# Patient Record
Sex: FEMALE | Race: WHITE
[De-identification: names, ages, dates, MRNs, and addresses within clinical notes are randomized per-mention and may not be internally consistent; named-entity substitution may affect disease eponyms.]

---

## 2018-03-09 ENCOUNTER — HOSPITAL ENCOUNTER (INPATIENT)
Dept: HOSPITAL 76 - ED | Age: 83
LOS: 4 days | Discharge: NURSING, REHAB OR LONG-TERM CARE | DRG: 689 | End: 2018-03-13
Attending: NURSE PRACTITIONER | Admitting: INTERNAL MEDICINE
Payer: MEDICARE

## 2018-03-09 ENCOUNTER — HOSPITAL ENCOUNTER (OUTPATIENT)
Dept: HOSPITAL 76 - EMS | Age: 83
Discharge: ANOTHER HOSPITAL | End: 2018-03-09
Attending: SURGERY
Payer: MEDICARE
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
## 2018-03-09 DIAGNOSIS — G93.41: ICD-10-CM

## 2018-03-09 DIAGNOSIS — E87.1: ICD-10-CM

## 2018-03-09 DIAGNOSIS — I48.0: ICD-10-CM

## 2018-03-09 DIAGNOSIS — E78.4: ICD-10-CM

## 2018-03-09 DIAGNOSIS — E86.1: ICD-10-CM
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
## 2018-03-09 DIAGNOSIS — M25.562: ICD-10-CM

## 2018-03-09 DIAGNOSIS — I50.9: ICD-10-CM

## 2018-03-09 DIAGNOSIS — Z16.11: ICD-10-CM

## 2018-03-09 DIAGNOSIS — S93.402A: ICD-10-CM

## 2018-03-09 DIAGNOSIS — R53.1: ICD-10-CM

## 2018-03-09 DIAGNOSIS — N17.9: ICD-10-CM

## 2018-03-09 DIAGNOSIS — E03.9: ICD-10-CM

## 2018-03-09 DIAGNOSIS — B96.89: ICD-10-CM

## 2018-03-09 DIAGNOSIS — N30.00: Primary | ICD-10-CM

## 2018-03-09 DIAGNOSIS — I48.91: ICD-10-CM

## 2018-03-09 DIAGNOSIS — I11.0: ICD-10-CM

## 2018-03-09 DIAGNOSIS — I69.359: ICD-10-CM

## 2018-03-09 DIAGNOSIS — J44.9: ICD-10-CM

## 2018-03-09 DIAGNOSIS — E86.0: ICD-10-CM

## 2018-03-09 DIAGNOSIS — Z79.01: ICD-10-CM

## 2018-03-09 DIAGNOSIS — R07.9: Primary | ICD-10-CM
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
## 2018-03-09 LAB
ALBUMIN DIAFP-MCNC: 3.9 G/DL (ref 3.2–5.5)
ALBUMIN/GLOB SERPL: 1.1 {RATIO} (ref 1–2.2)
ALP SERPL-CCNC: 49 IU/L (ref 42–121)
ALT SERPL W P-5'-P-CCNC: 12 IU/L (ref 10–60)
ANION GAP SERPL CALCULATED.4IONS-SCNC: 12 MMOL/L (ref 6–13)
AST SERPL W P-5'-P-CCNC: 25 IU/L (ref 10–42)
BASOPHILS NFR BLD AUTO: 0.1 10^3/UL (ref 0–0.1)
BASOPHILS NFR BLD AUTO: 0.4 %
BILIRUB BLD-MCNC: 0.6 MG/DL (ref 0.2–1)
BUN SERPL-MCNC: 26 MG/DL (ref 6–20)
CALCIUM UR-MCNC: 8.4 MG/DL (ref 8.5–10.3)
CHLORIDE SERPL-SCNC: 95 MMOL/L (ref 101–111)
CLARITY UR REFRACT.AUTO: (no result)
CO2 SERPL-SCNC: 23 MMOL/L (ref 21–32)
CREAT SERPLBLD-SCNC: 1.9 MG/DL (ref 0.4–1)
EOSINOPHIL # BLD AUTO: 0.1 10^3/UL (ref 0–0.7)
EOSINOPHIL NFR BLD AUTO: 0.6 %
ERYTHROCYTE [DISTWIDTH] IN BLOOD BY AUTOMATED COUNT: 14.1 % (ref 12–15)
GFRSERPLBLD MDRD-ARVRAT: 25 ML/MIN/{1.73_M2} (ref 89–?)
GLOBULIN SER-MCNC: 3.7 G/DL (ref 2.1–4.2)
GLUCOSE SERPL-MCNC: 147 MG/DL (ref 70–100)
GLUCOSE UR QL STRIP.AUTO: NEGATIVE MG/DL
HGB UR QL STRIP: 10.7 G/DL (ref 12–16)
KETONES UR QL STRIP.AUTO: NEGATIVE MG/DL
LIPASE SERPL-CCNC: 23 U/L (ref 22–51)
LYMPHOCYTES # SPEC AUTO: 0.7 10^3/UL (ref 1.5–3.5)
LYMPHOCYTES NFR BLD AUTO: 5.8 %
MAGNESIUM SERPL-MCNC: 1.8 MG/DL (ref 1.7–2.8)
MCH RBC QN AUTO: 31.9 PG (ref 27–31)
MCHC RBC AUTO-ENTMCNC: 33.1 G/DL (ref 32–36)
MCV RBC AUTO: 96.4 FL (ref 81–99)
MONOCYTES # BLD AUTO: 0.6 10^3/UL (ref 0–1)
MONOCYTES NFR BLD AUTO: 5.2 %
NEUTROPHILS # BLD AUTO: 10.4 10^3/UL (ref 1.5–6.6)
NEUTROPHILS # SNV AUTO: 11.8 X10^3/UL (ref 4.8–10.8)
NEUTROPHILS NFR BLD AUTO: 88 %
NITRITE UR QL STRIP.AUTO: NEGATIVE
PDW BLD AUTO: 7.1 FL (ref 7.9–10.8)
PH UR STRIP.AUTO: 6 PH (ref 5–7.5)
PHOSPHATE BLD-MCNC: 3.8 MG/DL (ref 2.5–4.6)
PLATELET # BLD: 245 10^3/UL (ref 130–450)
PROT SPEC-MCNC: 7.6 G/DL (ref 6.7–8.2)
PROT UR STRIP.AUTO-MCNC: NEGATIVE MG/DL
RBC # UR STRIP.AUTO: (no result) /UL
RBC # URNS HPF: (no result) /HPF (ref 0–5)
RBC MAR: 3.35 10^6/UL (ref 4.2–5.4)
SODIUM SERPLBLD-SCNC: 130 MMOL/L (ref 135–145)
SP GR UR STRIP.AUTO: <=1.005 (ref 1–1.03)
SQUAMOUS URNS QL MICRO: (no result)
UROBILINOGEN UR QL STRIP.AUTO: (no result) E.U./DL
UROBILINOGEN UR STRIP.AUTO-MCNC: NEGATIVE MG/DL
WBC CLUMPS URNS QL MICRO: PRESENT

## 2018-03-09 PROCEDURE — 96365 THER/PROPH/DIAG IV INF INIT: CPT

## 2018-03-09 PROCEDURE — 36415 COLL VENOUS BLD VENIPUNCTURE: CPT

## 2018-03-09 PROCEDURE — 70450 CT HEAD/BRAIN W/O DYE: CPT

## 2018-03-09 PROCEDURE — 83735 ASSAY OF MAGNESIUM: CPT

## 2018-03-09 PROCEDURE — 87086 URINE CULTURE/COLONY COUNT: CPT

## 2018-03-09 PROCEDURE — 80053 COMPREHEN METABOLIC PANEL: CPT

## 2018-03-09 PROCEDURE — 84443 ASSAY THYROID STIM HORMONE: CPT

## 2018-03-09 PROCEDURE — 94640 AIRWAY INHALATION TREATMENT: CPT

## 2018-03-09 PROCEDURE — 84100 ASSAY OF PHOSPHORUS: CPT

## 2018-03-09 PROCEDURE — 99285 EMERGENCY DEPT VISIT HI MDM: CPT

## 2018-03-09 PROCEDURE — 93005 ELECTROCARDIOGRAM TRACING: CPT

## 2018-03-09 PROCEDURE — 81003 URINALYSIS AUTO W/O SCOPE: CPT

## 2018-03-09 PROCEDURE — 83880 ASSAY OF NATRIURETIC PEPTIDE: CPT

## 2018-03-09 PROCEDURE — 84484 ASSAY OF TROPONIN QUANT: CPT

## 2018-03-09 PROCEDURE — 87077 CULTURE AEROBIC IDENTIFY: CPT

## 2018-03-09 PROCEDURE — 71046 X-RAY EXAM CHEST 2 VIEWS: CPT

## 2018-03-09 PROCEDURE — 85025 COMPLETE CBC W/AUTO DIFF WBC: CPT

## 2018-03-09 PROCEDURE — 83690 ASSAY OF LIPASE: CPT

## 2018-03-09 PROCEDURE — 85610 PROTHROMBIN TIME: CPT

## 2018-03-09 PROCEDURE — 81001 URINALYSIS AUTO W/SCOPE: CPT

## 2018-03-09 RX ADMIN — SODIUM CHLORIDE SCH MLS/HR: 9 INJECTION, SOLUTION INTRAVENOUS at 22:43

## 2018-03-09 NOTE — ED PHYSICIAN DOCUMENTATION
History of Present Illness





- Stated complaint


Stated Complaint: GENERAL WEAKNESS





- Chief complaint


Chief Complaint: Cardiac





- History obtained from


History obtained from: Patient, Family (daughter), EMS





- History of Present Illness


Timing: Today


Pain level max: 6


Pain level now: 5


Improved by: rest


Worsened by: movement of the leg.





- Additonal information


Additional information: 





states walking with her walker today. Legs gave out on her and she almost fell. 

States her L knee and ankle hurt now. Complained of chest pain x1-2 mins. No 

chest pain now. No shortness of breath.  





Review of Systems


Ten Systems: 10 systems reviewed and negative


Constitutional: denies: Fever, Chills


Nose: denies: Rhinorrhea / runny nose, Congestion


Throat: denies: Sore throat


Cardiac: denies: Palpitations


Respiratory: denies: Cough, Wheezing


GI: denies: Abdominal Pain, Nausea, Vomiting, Diarrhea


Skin: denies: Rash


Musculoskeletal: denies: Neck pain, Back pain


Neurologic: denies: Headache





PD PAST MEDICAL HISTORY





- Past Medical History


Cardiovascular: Congestive heart failure, Atrial fibrillation


Respiratory: Asthma


Neuro: Head injury


Endocrine/Autoimmune: None, HyPOthyroidism


GI: GERD


: Incontinence


HEENT: None


Psych: None


Musculoskeletal: None


Derm: None





- Past Surgical History


Past Surgical History: No





- Present Medications


Home Medications: 


 Ambulatory Orders











 Medication  Instructions  Recorded  Confirmed


 


Diltiazem HCl [Diltiazem 24Hr ER] 240 mg PO DAILY 08/14/14 11/08/16


 


Furosemide [Lasix] 20 mg PO DAILY 08/14/14 11/08/16


 


Levothyroxine [Synthroid] 25 mcg PO DAILY 08/14/14 11/08/16


 


Potassium Chloride 10 meq PO DAILY 08/14/14 11/08/16


 


Pravastatin [Pravachol] 10 mg PO DAILY 11/08/16 11/08/16


 


Warfarin [Coumadin] 1.5 mg PO DAILY 11/08/16 11/08/16


 


Calcium Carbonate [Tums (Calcium 500 mg PO DAILY  tablet 11/09/16 





Carbonate 500mg)]   


 


Levalbuterol [Xopenex] 1.25 mg INH RTQ6H PRN #0 neb 11/09/16 


 


Multivitamin [Theragran] 1 tab PO DAILYWM  tablet 11/09/16 


 


Omega-3 Acid Ethyl Esters [Lovaza] 1 gm PO Q48H  capsule 11/09/16 














- Allergies


Allergies/Adverse Reactions: 


 Allergies











Allergy/AdvReac Type Severity Reaction Status Date / Time


 


No Known Drug Allergies Allergy   Verified 11/08/16 05:37














- Social History


Does the pt smoke?: No


Smoking Status: Never smoker


Does the pt drink ETOH?: No


Does the pt have substance abuse?: No





- Immunizations


Immunizations are current?: Yes





- POLST


Patient has POLST: Yes





PD ED PE NORMAL





- Vitals


Vital signs reviewed: Yes





- General


General: Alert and oriented X 3, No acute distress





- HEENT


HEENT: Atraumatic, PERRL, Moist mucous membranes





- Neck


Neck: Supple, no meningeal sign





- Cardiac


Cardiac: RRR, Strong equal pulses





- Respiratory


Respiratory: No respiratory distress, Other (wheezing B)





- Abdomen


Abdomen: Soft, Non tender, Non distended





- Back


Back: No spinal TTP





- Derm


Derm: Warm and dry, No rash





- Extremities


Extremities: Other (TTP about the L knee and ankle. Swelling present. )





- Neuro


Neuro: Alert and oriented X 3





- Psych


Psych: Normal mood, Normal affect





Results





- Vitals


Vitals: 


 Vital Signs - 24 hr











  03/09/18 03/09/18 03/09/18





  17:37 19:58 20:10


 


Temperature 36.6 C 98.5 C H 


 


Heart Rate 52 L 61 61


 


Respiratory 24 20 18





Rate   


 


Blood Pressure 141/61 H  


 


O2 Saturation 97 99 














  03/09/18





  21:05


 


Temperature 


 


Heart Rate 73


 


Respiratory 17





Rate 


 


Blood Pressure 122/54 L


 


O2 Saturation 98








 Oxygen











O2 Source [Without Activity]   Room air


 


O2 Source                      Room air

















- EKG (time done)


  ** 1746


Rate: Rate (enter#) (51)


Rhythm: NSR


Axis: Normal


Intervals: Normal NV


QRS: Normal


Ischemia: Normal ST segments





- Labs


Labs: 


 Laboratory Tests











  03/09/18 03/09/18 03/09/18





  18:17 18:17 18:17


 


WBC  11.8 H  


 


RBC  3.35 L  


 


Hgb  10.7 L  


 


Hct  32.3 L  


 


MCV  96.4  


 


MCH  31.9 H  


 


MCHC  33.1  


 


RDW  14.1  


 


Plt Count  245  


 


MPV  7.1 L  


 


Neut #  10.4 H  


 


Lymph #  0.7 L  


 


Mono #  0.6  


 


Eos #  0.1  


 


Baso #  0.1  


 


Absolute Nucleated RBC  0.00  


 


Nucleated RBC %  0.0  


 


Sodium   130 L 


 


Potassium   5.5 H 


 


Chloride   95 L 


 


Carbon Dioxide   23 


 


Anion Gap   12.0 


 


BUN   26 H 


 


Creatinine   1.9 H 


 


Estimated GFR (MDRD)   25 L 


 


Glucose   147 H 


 


Calcium   8.4 L 


 


Phosphorus   


 


Magnesium   


 


Total Bilirubin   0.6 


 


AST   25 


 


ALT   12 


 


Alkaline Phosphatase   49 


 


Troponin I    < 0.04


 


B-Natriuretic Peptide   


 


Total Protein   7.6 


 


Albumin   3.9 


 


Globulin   3.7 


 


Albumin/Globulin Ratio   1.1 


 


Lipase   23 














  03/09/18 03/09/18





  18:17 18:17


 


WBC  


 


RBC  


 


Hgb  


 


Hct  


 


MCV  


 


MCH  


 


MCHC  


 


RDW  


 


Plt Count  


 


MPV  


 


Neut #  


 


Lymph #  


 


Mono #  


 


Eos #  


 


Baso #  


 


Absolute Nucleated RBC  


 


Nucleated RBC %  


 


Sodium  


 


Potassium  


 


Chloride  


 


Carbon Dioxide  


 


Anion Gap  


 


BUN  


 


Creatinine  


 


Estimated GFR (MDRD)  


 


Glucose  


 


Calcium  


 


Phosphorus   3.8


 


Magnesium   1.8


 


Total Bilirubin  


 


AST  


 


ALT  


 


Alkaline Phosphatase  


 


Troponin I  


 


B-Natriuretic Peptide  389 H 


 


Total Protein  


 


Albumin  


 


Globulin  


 


Albumin/Globulin Ratio  


 


Lipase  














- Rads (name of study)


  ** cxr


Radiology: Prelim report reviewed, EMP read contemporaneously, See rad report (

no acute disease)





  ** L knee xray


Radiology: Prelim report reviewed, EMP read contemporaneously, See rad report (

no fracture)





  ** L ankle xray


Radiology: Prelim report reviewed, EMP read contemporaneously, See rad report (

no fractures.)





PD MEDICAL DECISION MAKING





- ED course


Complexity details: reviewed results, re-evaluated patient, considered 

differential, d/w patient, d/w family


ED course: 





Patient is an 89-year-old female who presents to the emergency department after 

a fall today.  She is found to have UTI.  Hospitalist will start antibiotics 

for this.  She is also found to be in acute renal failure with hyperkalemia and 

hyponatremia. No acute findings on x-rays.  She began to have increased altered 

mental status in the emergency department, likely from her UTI and 

leukocytosis.  Will admit the patient for further evaluation and care.  

Discussed with Dr. Mcneal, hospitalist who accepts





This document was made in part using voice recognition software. While efforts 

are made to proofread this document, sound alike and grammatical errors may 

occur.





Departure





- Departure


Disposition: [facility] DC/Xfer


Clinical Impression: 


 Hyperkalemia, Hyponatremia, Supratherapeutic INR





Acute renal failure


Qualifiers:


 Acute renal failure type: unspecified Qualified Code(s): N17.9 - Acute kidney 

failure, unspecified





Left ankle sprain


Qualifiers:


 Encounter type: initial encounter Involved ligament of ankle: unspecified 

ligament Qualified Code(s): S93.402A - Sprain of unspecified ligament of left 

ankle, initial encounter





Condition: Stable


Discharge Date/Time: 03/09/18 22:10

## 2018-03-09 NOTE — XRAY REPORT
EXAM:

CHEST RADIOGRAPHY

 

EXAM DATE: 3/9/2018 07:07 PM.

 

CLINICAL HISTORY: Chest pain. Generalized Weakness.

 

COMPARISON: 11/08/2016.

 

TECHNIQUE: 2 views.

 

FINDINGS: 

Lungs/Pleura: No focal opacities evident. No pleural effusion. No pneumothorax. Normal volumes.

 

Mediastinum: Large heart.

 

Other: No compression fractures.

 

IMPRESSION: Cardiomegaly without CHF or acute pulmonary abnormality.

 

[facility]

Referring Provider Line: [phone]

 

SITE ID: [id number]

## 2018-03-09 NOTE — XRAY REPORT
EXAM:

LEFT KNEE RADIOGRAPHY

 

EXAM DATE: 3/9/2018 07:08 PM.

 

CLINICAL HISTORY: Fall. Left knee pain.

 

COMPARISON: None.

 

TECHNIQUE: 4 views.

 

FINDINGS: 

Bones: Normal. No fractures or bone lesions.

 

Joints: Normal. No effusion. No subluxations.

 

Soft Tissues: Mild atherosclerotic arterial calcification noted.

 

IMPRESSION: Normal knee radiography.

 

[facility]

Referring Provider Line: [phone]

 

SITE ID: [id number]

## 2018-03-09 NOTE — HISTORY & PHYSICAL EXAMINATION
Chief Complaint





- Chief Complaint


Chief Complaint: Generalized weakness and lethargy





History of Present Illness





- Admitted From


Admitted From:: Emergency department





- History Obtained From


Records Reviewed: Yes


History obtained from: Patient and patient's daughter


Exam Limitations: Patient is confused and unable to provide detailed history





- History of Present Illness


HPI Comment/Other: 





Patient is an 89-year-old female with past medical history significant for 

hypertension, hypothyroidism, COPD, vitamin D deficiency, history of stroke in 

2016 with minimal residual deficits and atrial fibrillation on Coumadin who 

presented to the emergency department with chief complaint of generalized 

weakness and lethargy.  According to the patient's daughter the patient was in 

her normal state of health until yesterday when she seem to be sluggish.  She 

was less active than normal and not speaking as much.  The patient's daughter 

states that today the patient had decreased appetite and did not eat much of 

her breakfast or lunch.  She states that she was less alert and was lethargic 

this evening.  She states that the patient was walking with her walker when her 

knees began to buckle and the patient slowly fell to the floor.  The patient 

was then unable to get up and appeared to be confused.  When asked the patient 

does state that she has been having dysuria over the last day and does feel 

nauseated.  The patient denies any fevers or chills.  The patient denies any 

abdominal pain or flank pain.





The patient denies any headaches, blurred vision, runny nose, sore throat, 

nasal congestion, difficulty swallowing, chest pain, shortness of air, orthopnea

, PND, increased lower extremity swelling, vomiting, constipation, diarrhea, 

joint swelling, joint pain, muscle aches, back pain, neck stiffness, recent 

unintentional weight loss or any focal neurologic deficits.





On presentation to the emergency department the patient was afebrile and her 

vital signs were stable.  The patient underwent routine lab work which did 

reveal a leukocytosis of 11.8 and a hyponatremia with sodium of 130.  The 

patient also appear to have acute kidney injury as her creatinine was up to 1.9 

from a baseline of 0.8-1.1.  The patient did have a slightly elevated INR of 

5.3 and an elevated potassium of 5.5.  The patient did suffer a sprained left 

ankle due to the fall and x-ray of the left ankle showed no evidence of 

fracture.  The patient had fallen on her left knee and a x-ray of the left knee 

was also normal.  The patient underwent a chest x-ray to look for possible 

source of infection given her lethargy, generalized weakness and leukocytosis 

but chest x-ray showed no acute pulmonary abnormality.  The patient's urine 

analysis did reveal large leukocyte esterase with greater than 25 WBCs and many 

bacteria consistent with a urinary tract infection.  The patient was admitted 

to the medical bah with a UTI, acute kidney injury and generalized weakness.





History





- Past Medical History


Cardiovascular: reports: Congestive heart failure, Hypertension, High 

cholesterol, Atrial fibrillation


Respiratory: reports: Asthma, COPD


Neuro: reports: CVA, Head injury


Endocrine/Autoimmune: reports: HyPOthyroidism


GI: reports: GERD


: reports: Incontinence


HEENT: reports: None


Psych: reports: None


Musculoskeletal: reports: None


Derm: reports: None


MRSA Hx?: No





- Family & Social History


Family History: Mother:  (Patient's mother  of rheumatic valvular 

heart disease in her 30s and her father  of old age at the age of 88), 

Father: , Sister: CAD, Brother: CAD, Other family: Cancer (Daughter 

with breast cancer)


Living arrangement: At home


Living Situation: With family


Social History Notes: Patient is originally from the [facility], she is 

 and she currently lives with her daughter and son-in-law in [place].

  She is 1 of 7 siblings.  She has never smoked, she does not use alcohol or 

drugs.





- POLST


Patient has POLST: Yes


POLST Status: Full Code





Meds/Allgy





- Home Medications


Home Medications: 


 Ambulatory Orders











 Medication  Instructions  Recorded  Confirmed


 


Diltiazem HCl [Diltiazem 24Hr ER] 240 mg PO DAILY 14


 


Furosemide [Lasix] 20 mg PO DAILY 14


 


Levothyroxine [Synthroid] 25 mcg PO DAILY 14


 


Potassium Chloride 10 meq PO DAILY 14


 


Pravastatin [Pravachol] 10 mg PO DAILY 16


 


Warfarin [Coumadin] 1.5 mg PO DAILY 16


 


Calcium Carbonate [Tums (Calcium 500 mg PO DAILY  tablet 16 





Carbonate 500mg)]   


 


Levalbuterol [Xopenex] 1.25 mg INH RTQ6H PRN #0 neb 16 


 


Multivitamin [Theragran] 1 tab PO DAILYWM  tablet 16 


 


Omega-3 Acid Ethyl Esters [Lovaza] 1 gm PO Q48H  capsule 16 














- Allergies


Allergies/Adverse Reactions: 


 Allergies











Allergy/AdvReac Type Severity Reaction Status Date / Time


 


No Known Drug Allergies Allergy   Verified 16 05:37














Review of Systems





- Other Findings


Other Findings: 





A comprehensive review of systems was performed the pertinent positives and 

negatives are stated above in the HPI and the remainder of the review of 

systems is negative.





Exam





- Vital Signs


Reviewed Vital Signs: Yes





- Physical Exam


General Appearance: positive: Alert (Confussed)


Eyes Bilateral: positive: Normal inspection, PERRL, EOMI, No lid inflammation, 

Conjunctivae nml, No scleral icterus


ENT: positive: ENT inspection nml, Pharynx nml, Dry mucous membranes.  negative

: Purulent nasal drainage, Pharyngeal erythema, Oral lesions


Neck: positive: Nml inspection, Thyroid nml, No JVD, Trachea midline.  negative

: Thyromegaly, Lymphadenopathy (R), Lymphadenopathy (L), Stiff neck, Carotid 

bruit, Tracheal deviation


Respiratory: positive: Chest non-tender, No respiratory distress, Wheezes (Mild

, scattered).  negative: Rales, Rhonchi


Cardiovascular: positive: Regular rate & rhythm, No murmur, No gallop


Peripheral Pulses: positive: 2+


Abdomen: positive: Non-tender, No organomegaly, Nml bowel sounds, No 

distention.  negative: Guarding, Rebound, Hepatomegaly


Back: positive: Nml inspection.  negative: CVA tenderness (R), CVA tenderness (L

)


Skin: positive: Color nml, No rash, Warm, Dry.  negative: Cyanosis, Diaphoresis


Extremities: positive: No pedal edema, Joint swelling (Left ankle), Other (Left 

ankle his sore and she does experience some pain with movement)


Neurologic/Psychiatric: positive: CN's nml (2-12), Motor nml, Sensation nml, 

Disoriented to time





Conclusion/Plan





- Problem List


(1) UTI (urinary tract infection)


Conclusion/Plan: 


Patient presented with generalized weakness and confusion.  Patient had a fall 

at home and then was unable to get up and was increasingly lethargic and 

confused.  She also had poor appetite throughout the day.  Patient complained 

of dysuria and nausea when she was in the emergency department.  Patient did 

have a mild leukocytosis, acute kidney injury and appeared dehydrated.  The 

patient's urine analysis revealed positive leukocyte esterase with greater than 

25 WBCs and bacteria consistent with a urinary tract infection.  Given the 

systemic nature of the urinary tract infection and the patient's age the 

patient was admitted to the medical bah for treatment for UTI with IV 

antibiotics and IV fluids.





Plan:


Patient will be given IV antibiotics with ceftriaxone


We will give the patient IV fluids


We will await urine culture








Qualifiers: 


   Urinary tract infection type: acute cystitis   Hematuria presence: without 

hematuria   Qualified Code(s): N30.00 - Acute cystitis without hematuria   





(2) Encephalopathy


Conclusion/Plan: 


Patient had lethargy and confusion on presentation to the emergency department 

and earlier today.  It appears that the encephalopathy is likely metabolic 

secondary to patient's urinary tract infection and dehydration with acute 

kidney injury.


We will treat the underlying cause which appears to be infection and 

dehydration with IV antibiotics and IV fluids and we expect that the patient's 

encephalopathy will improve.  If the patient continues to have altered 

mentation or does have some focal deficits then we will need to consider a CT 

of the patient's head especially in the setting of an elevated INR.











(3) YONATAN (acute kidney injury)


Conclusion/Plan: 


Patient has a baseline creatinine of 0.8-1.1 today on presentation the patient'

s creatinine is elevated at 1.9.  Patient also has an elevated BUN of 26 and 

her labs as well as her physical exam are consistent with dehydration.  Patient 

appears likely to have dehydration secondary to urinary tract infection and 

poor appetite throughout the day today.





Plan:


Patient will be given IV fluids


We will avoid nephrotoxic agents including lasix


We will monitor the patient's creatinine








(4) Atrial fibrillation


Conclusion/Plan: 


The patient has a history of atrial fibrillation and did have an embolic stroke 

in 2016.  She is currently on Coumadin at home and diltiazem for rate control.  

Her EKG does not show atrial fibrillation.


Patient's INR on presentation is 5.3





Plan:


Patient's Coumadin will be held until INR drops below 3


We will continue the patient on her home dose of diltiazem


Monitor


Qualifiers: 


   Atrial fibrillation type: paroxysmal   Qualified Code(s): I48.0 - Paroxysmal 

atrial fibrillation   





(5) Hypothyroidism


Conclusion/Plan: 


The patient has history of hypothyroidism and is on Synthroid at home.  

Currently she appears to be stable from the standpoint of hypothyroidism.  We 

will continue her home Synthroid and check her TSH in the morning.


Qualifiers: 


   Hypothyroidism type: unspecified   Qualified Code(s): E03.9 - Hypothyroidism

, unspecified   





(6) Hypertension


Conclusion/Plan: 


Patient has history of hypertension and is on antihypertensives at home on 

presentation to the emergency department the patient's blood pressure was 

elevated however on presentation to the medical bah the patient's blood 

pressure is borderline at 101/39.  We will cautiously continue the patient's 

home antihypertensive medications but we will keep a close eye on her blood 

pressure as if it does drop we will need to stop the antihypertensives and give 

fluid resuscitation in the setting of an infection.


Qualifiers: 


   Hypertension type: essential hypertension   Qualified Code(s): I10 - 

Essential (primary) hypertension   





(7) Hyponatremia


Conclusion/Plan: 


The patient has hyponatremia on presentation with a sodium of 130.  Patient 

does appear to be dehydrated and this is likely hypovolemic hyponatremia.


Patient will be given IV fluids and will continue to monitor her sodium.


Hold lasix








(8) Hyperlipidemia


Conclusion/Plan: 


Patient has history of hyperlipidemia and is on a statin at home we will 

continue her on statin while she is hospitalized 


Qualifiers: 


   Hyperlipidemia type: other hyperlipidemia   Qualified Code(s): E78.4 - Other 

hyperlipidemia   





(9) Hyperkalemia


Conclusion/Plan: 


Likely secondary to acute kidney injury


EKG does not show any peaked T waves


We will treat the underlying cause which appears to be acute kidney injury with 

IV fluids and monitor the patient's potassium








- Lab Results


Lab results reviewed: Yes


Fish Bones: 


 18 18:17





 18 18:17


Other Lab Results: 








 Laboratory Results











WBC  11.8 x10^3/uL (4.8-10.8)  H  18  18:17    


 


RBC  3.35 10^6/uL (4.20-5.40)  L  18  18:17    


 


Hgb  10.7 g/dL (12.0-16.0)  L  18  18:17    


 


Hct  32.3 % (37.0-47.0)  L  18  18:17    


 


MCV  96.4 fL (81.0-99.0)   18  18:17    


 


MCH  31.9 pg (27.0-31.0)  H  18  18:17    


 


MCHC  33.1 g/dL (32.0-36.0)   18  18:17    


 


RDW  14.1 % (12.0-15.0)   18  18:17    


 


Plt Count  245 10^3/uL (130-450)   18  18:17    


 


MPV  7.1 fL (7.9-10.8)  L  18  18:17    


 


Neut #  10.4 10^3/uL (1.5-6.6)  H  18  18:17    


 


Lymph #  0.7 10^3/uL (1.5-3.5)  L  18  18:17    


 


Mono #  0.6 10^3/uL (0.0-1.0)   18  18:17    


 


Eos #  0.1 10^3/uL (0.0-0.7)   18  18:17    


 


Baso #  0.1 10^3/uL (0.0-0.1)   18  18:17    


 


Absolute Nucleated RBC  0.00 x10^3/uL  18  18:17    


 


Nucleated RBC %  0.0 /100WBC  18  18:17    


 


Whole Blood INR  5.3  (0.8-1.2)  H*  18  21:59    


 


Sodium  130 mmol/L (135-145)  L  18  18:17    


 


Potassium  5.5 mmol/L (3.5-5.0)  H  18  18:17    


 


Chloride  95 mmol/L (101-111)  L  18  18:17    


 


Carbon Dioxide  23 mmol/L (21-32)   18  18:17    


 


Anion Gap  12.0  (6-13)   18  18:17    


 


BUN  26 mg/dL (6-20)  H  18  18:17    


 


Creatinine  1.9 mg/dL (0.4-1.0)  H  18  18:17    


 


Estimated GFR (MDRD)  25  (>89)  L  18  18:17    


 


Glucose  147 mg/dL ()  H  18  18:17    


 


Calcium  8.4 mg/dL (8.5-10.3)  L  18  18:17    


 


Phosphorus  3.8 mg/dL (2.5-4.6)   18  18:17    


 


Magnesium  1.8 mg/dL (1.7-2.8)   18  18:17    


 


Total Bilirubin  0.6 mg/dL (0.2-1.0)   18  18:17    


 


AST  25 IU/L (10-42)   18  18:17    


 


ALT  12 IU/L (10-60)   18  18:17    


 


Alkaline Phosphatase  49 IU/L ()   18  18:17    


 


Troponin I  < 0.04 ng/mL (<0.49)   18  18:17    


 


B-Natriuretic Peptide  389 pg/mL (5-100)  H  18  18:17    


 


Total Protein  7.6 g/dL (6.7-8.2)   18  18:17    


 


Albumin  3.9 g/dL (3.2-5.5)   18  18:17    


 


Globulin  3.7 g/dL (2.1-4.2)   18  18:17    


 


Albumin/Globulin Ratio  1.1  (1.0-2.2)   18  18:17    


 


Lipase  23 U/L (22-51)   18  18:17    


 


Urine Color  YELLOW   18  21:30    


 


Urine Clarity  HAZY  (CLEAR)   18  21:30    


 


Urine pH  6.0 PH (5.0-7.5)   18  21:30    


 


Ur Specific Gravity  <=1.005  (1.002-1.030)   18  21:30    


 


Urine Protein  NEGATIVE mg/dL (NEGATIVE)   18  21:30    


 


Urine Glucose (UA)  NEGATIVE mg/dL (NEGATIVE)   18  21:30    


 


Urine Ketones  NEGATIVE mg/dL (NEGATIVE)   18  21:30    


 


Urine Occult Blood  TRACE-LYSE  (NEGATIVE)   18  21:30    


 


Urine Nitrite  NEGATIVE  (NEGATIVE)   18  21:30    


 


Urine Bilirubin  NEGATIVE  (NEGATIVE)   18  21:30    


 


Urine Urobilinogen  0.2 (NORMAL) E.U./dL (NORMAL)   18  21:30    


 


Ur Leukocyte Esterase  LARGE  (NEGATIVE)  H  18  21:30    


 


Urine RBC  0-5 /HPF (0-5)   18  21:30    


 


Urine WBC  >25 /HPF (0-5)  H  18  21:30    


 


Urine WBC Clumps  PRESENT   18  21:30    


 


Ur Squamous Epith Cells  FEW Squamous  (<= Few)   18  21:30    


 


Urine Bacteria  Many /HPF (None Seen)  H  18  21:30    


 


Ur Microscopic Review  INDICATED   18  21:30    


 


Urine Culture Comments  INDICATED   18  21:30    














- Diagnostic Imaging Results


Diagnostic Imaging Results: positive: Final report reviewed


Diagnostic Imaging Results Comments: 





EXAM: 


CHEST RADIOGRAPHY 





EXAM DATE: 3/9/2018 07:07 PM. 





CLINICAL HISTORY: Chest pain. Generalized Weakness. 





COMPARISON: 2016. 





TECHNIQUE: 2 views. 





FINDINGS: 


Lungs/Pleura: No focal opacities evident. No pleural effusion. No pneumothorax. 

Normal volumes. 





Mediastinum: Large heart. 





Other: No compression fractures. 





IMPRESSION: Cardiomegaly without CHF or acute pulmonary abnormality. 





EXAM: [id number] XR/ANKL ([id number]) 


EXAM: 


LEFT ANKLE RADIOGRAPHY 





EXAM DATE: 3/9/2018 07:08 PM. 





CLINICAL HISTORY: Fall. Left ankle pain. 





COMPARISON: None. 





TECHNIQUE: 3 views. 





FINDINGS: 


Bones: No traumatic or destructive bone abnormalities. Calcaneal enthesophytes 

noted. 





Joints: Normal. No effusion. No subluxations. The ankle mortise is normally 

aligned. 





Soft Tissues: Unremarkable. 





IMPRESSION: No acute bony abnormalities. Calcaneal enthesophytes noted. 





EXAM: 


LEFT KNEE RADIOGRAPHY 





EXAM DATE: 3/9/2018 07:08 PM. 





CLINICAL HISTORY: Fall. Left knee pain. 





COMPARISON: None. 





TECHNIQUE: 4 views. 





FINDINGS: 


Bones: Normal. No fractures or bone lesions. 





Joints: Normal. No effusion. No subluxations. 





Soft Tissues: Mild atherosclerotic arterial calcification noted. 





IMPRESSION: Normal knee radiography. 











- EKG Results


EKG Interpreted Independently: Yes


EKG Findings: 





No ST elevations or ischemic changes





Core Measures





- Anticipated LOS


I expect patient to be DC'd or transferred within 96 hours.: Yes





- DVT/VTE - Prophylaxis


VTE/DVT Device ordered at admit?: Yes

## 2018-03-09 NOTE — XRAY REPORT
EXAM:

LEFT ANKLE RADIOGRAPHY

 

EXAM DATE: 3/9/2018 07:08 PM.

 

CLINICAL HISTORY: Fall. Left ankle pain.

 

COMPARISON: None.

 

TECHNIQUE: 3 views.

 

FINDINGS: 

Bones: No traumatic or destructive bone abnormalities. Calcaneal enthesophytes noted.

 

Joints: Normal. No effusion. No subluxations. The ankle mortise is normally aligned.

 

Soft Tissues: Unremarkable.

 

IMPRESSION: No acute bony abnormalities. Calcaneal enthesophytes noted.

 

[facility]

Referring Provider Line: [phone]

 

SITE ID: [id number]

## 2018-03-10 LAB
ALBUMIN DIAFP-MCNC: 3.5 G/DL (ref 3.2–5.5)
ALBUMIN/GLOB SERPL: 1.1 {RATIO} (ref 1–2.2)
ALP SERPL-CCNC: 42 IU/L (ref 42–121)
ALT SERPL W P-5'-P-CCNC: 10 IU/L (ref 10–60)
ANION GAP SERPL CALCULATED.4IONS-SCNC: 8 MMOL/L (ref 6–13)
AST SERPL W P-5'-P-CCNC: 17 IU/L (ref 10–42)
BASOPHILS NFR BLD AUTO: 0 10^3/UL (ref 0–0.1)
BASOPHILS NFR BLD AUTO: 0.7 %
BILIRUB BLD-MCNC: 0.6 MG/DL (ref 0.2–1)
BUN SERPL-MCNC: 22 MG/DL (ref 6–20)
CALCIUM UR-MCNC: 7.7 MG/DL (ref 8.5–10.3)
CHLORIDE SERPL-SCNC: 105 MMOL/L (ref 101–111)
CO2 SERPL-SCNC: 24 MMOL/L (ref 21–32)
CREAT SERPLBLD-SCNC: 1.2 MG/DL (ref 0.4–1)
EOSINOPHIL # BLD AUTO: 0.1 10^3/UL (ref 0–0.7)
EOSINOPHIL NFR BLD AUTO: 2.2 %
ERYTHROCYTE [DISTWIDTH] IN BLOOD BY AUTOMATED COUNT: 14.4 % (ref 12–15)
GFRSERPLBLD MDRD-ARVRAT: 42 ML/MIN/{1.73_M2} (ref 89–?)
GLOBULIN SER-MCNC: 3.1 G/DL (ref 2.1–4.2)
GLUCOSE SERPL-MCNC: 78 MG/DL (ref 70–100)
HGB UR QL STRIP: 9.1 G/DL (ref 12–16)
LYMPHOCYTES # SPEC AUTO: 1.2 10^3/UL (ref 1.5–3.5)
LYMPHOCYTES NFR BLD AUTO: 19.4 %
MCH RBC QN AUTO: 32 PG (ref 27–31)
MCHC RBC AUTO-ENTMCNC: 33.3 G/DL (ref 32–36)
MCV RBC AUTO: 96.2 FL (ref 81–99)
MONOCYTES # BLD AUTO: 0.6 10^3/UL (ref 0–1)
MONOCYTES NFR BLD AUTO: 9.7 %
NEUTROPHILS # BLD AUTO: 4.4 10^3/UL (ref 1.5–6.6)
NEUTROPHILS # SNV AUTO: 6.4 X10^3/UL (ref 4.8–10.8)
NEUTROPHILS NFR BLD AUTO: 68 %
PDW BLD AUTO: 7.2 FL (ref 7.9–10.8)
PLATELET # BLD: 211 10^3/UL (ref 130–450)
PROT SPEC-MCNC: 6.6 G/DL (ref 6.7–8.2)
RBC MAR: 2.85 10^6/UL (ref 4.2–5.4)
SODIUM SERPLBLD-SCNC: 137 MMOL/L (ref 135–145)

## 2018-03-10 RX ADMIN — POLYETHYLENE GLYCOL 3350 SCH GM: 17 POWDER, FOR SOLUTION ORAL at 10:30

## 2018-03-10 RX ADMIN — SODIUM CHLORIDE SCH MLS/HR: 9 INJECTION, SOLUTION INTRAVENOUS at 10:32

## 2018-03-10 RX ADMIN — Medication SCH MG: at 10:29

## 2018-03-10 RX ADMIN — SODIUM CHLORIDE, PRESERVATIVE FREE SCH: 5 INJECTION INTRAVENOUS at 10:33

## 2018-03-10 RX ADMIN — PANTOPRAZOLE SODIUM SCH MG: 40 TABLET, DELAYED RELEASE ORAL at 06:17

## 2018-03-10 RX ADMIN — SODIUM CHLORIDE SCH MLS/HR: 9 INJECTION, SOLUTION INTRAVENOUS at 20:55

## 2018-03-10 RX ADMIN — LEVOTHYROXINE SODIUM SCH MCG: 0.03 TABLET ORAL at 06:17

## 2018-03-10 RX ADMIN — SODIUM CHLORIDE, PRESERVATIVE FREE SCH: 5 INJECTION INTRAVENOUS at 23:28

## 2018-03-10 RX ADMIN — Medication SCH MG: at 17:57

## 2018-03-10 RX ADMIN — DEXTROSE MONOHYDRATE SCH MLS/HR: 50 INJECTION, SOLUTION INTRAVENOUS at 00:35

## 2018-03-10 RX ADMIN — SODIUM CHLORIDE, PRESERVATIVE FREE SCH: 5 INJECTION INTRAVENOUS at 22:02

## 2018-03-10 RX ADMIN — PRAVASTATIN SODIUM SCH MG: 10 TABLET ORAL at 10:30

## 2018-03-10 RX ADMIN — SODIUM CHLORIDE, PRESERVATIVE FREE SCH: 5 INJECTION INTRAVENOUS at 00:13

## 2018-03-10 NOTE — PROVIDER PROGRESS NOTE
Subjective





- Prog Note Date


Prog Note Date: 03/10/18


Prog Note Time: 19:16





- Subjective


Subjective: 





daughter described a 2 day hx of hallucination, weakness, less and less aware. 


better but still not at baseline. still sleepy and a little confused. takes 

lots of prompting to respond even to the nurses who speak her native tongue.





Current Medications





- Current Medications


Current Medications: 





Active Medications





Acetaminophen (Tylenol)  650 mg PO Q4HR PRN


   PRN Reason: Pain 1 to 4


Acetaminophen/Hydrocodone Bitart (Norco 5/325)  1 tab PO Q4HR PRN


   PRN Reason: Pain 5 to 7


Diltiazem HCl (Cardizem Cd)  240 mg PO DAILY [facility]


Sodium Chloride (Normal Saline 0.9%)  1,000 mls @ 100 mls/hr IV .Q10H [facility]


   Last Admin: 03/09/18 22:43 Dose:  100 mls/hr


Ceftriaxone Sodium 1 gm/ (Sodium Chloride)  100 mls @ 200 mls/hr IV Q24H [facility]


   Last Infusion: 03/10/18 01:24 Dose:  Infused


Levothyroxine Sodium (Synthroid)  25 mcg PO QDAC [facility]


   Last Admin: 03/10/18 06:17 Dose:  25 mcg


Ondansetron HCl (Zofran Inj)  4 mg IVP Q6HR PRN


   PRN Reason: Nausea / Vomiting


Pantoprazole Sodium (Protonix)  40 mg PO QDAC [facility]


   Last Admin: 03/10/18 06:17 Dose:  40 mg


Polyethylene Glycol (Miralax)  17 gm PO DAILY [facility]


Pravastatin Sodium (Pravachol)  10 mg PO DAILY [facility]


Prochlorperazine Edisylate (Compazine Inj)  10 mg IVP Q6HR PRN


   PRN Reason: Nausea / Vomiting


Saccharomyces Boulardii (Florastor)  250 mg PO BIDWM [facility]


Sodium Chloride (Normal Saline Flush 0.9%)  10 ml IVP PRN PRN


   PRN Reason: AS NEEDED PER PROVIDER ORDERS


Sodium Chloride (Normal Saline Flush 0.9%)  10 ml IVP 0100,0900,1700 [facility]


   Last Admin: 03/10/18 00:13 Dose:  Not Given





 





Diltiazem HCl [Diltiazem 24Hr ER] 240 mg PO DAILY 08/14/14 


Furosemide [Lasix] 20 mg PO DAILY 08/14/14 


Levothyroxine [Synthroid] 25 mcg PO DAILY 08/14/14 


Potassium Chloride 10 meq PO DAILY 08/14/14 


Pravastatin [Pravachol] 10 mg PO DAILY 11/08/16 


Warfarin [Coumadin] 1.5 mg PO DAILY 11/08/16 











Objective





- Vital Signs/Intake & Output


Reviewed Vital Signs: Yes


Vital Signs: 


 Vital Signs x48h











  Temp Pulse Resp BP Pulse Ox


 


 03/10/18 07:36  37.0 C  88  19  120/54 L  100











Intake & Output: 


 Intake & Output











 03/07/18 03/08/18 03/09/18 03/10/18





 23:59 23:59 23:59 23:59


 


Intake Total    100


 


Output Total   200 


 


Balance   -200 100














- Objective


General Appearance: positive: No acute distress, Other (wakes easily)


Eyes Bilateral: positive: PERRL, EOMI


ENT: positive: Dry mucous membranes


Neck: positive: Thyroid nml.  negative: Stiff neck, Carotid bruit


Respiratory: positive: Chest non-tender.  negative: Wheezes, Rales, Rhonchi


Cardiovascular: positive: Regular rate & rhythm, Systolic murmur.  negative: 

Gallop/S4, Friction rub


Abdomen: positive: Non-tender, No organomegaly, Nml bowel sounds


Skin: positive: Warm, Dry


Extremities: positive: No pedal edema


Neurologic/Psychiatric: positive: Disoriented to place, Disoriented to time, 

Other (cognitive deficit evident).  negative: Motor nml (residual of stroke: 

weak on one side)





- Lab Results


Fish Bones: 


 03/10/18 04:25





 03/10/18 04:25


Other Labs: 


 Lab Results x24hrs











  03/10/18 03/10/18 03/10/18 Range/Units





  04:25 04:25 04:25 


 


WBC    6.4  (4.8-10.8)  x10^3/uL


 


RBC    2.85 L  (4.20-5.40)  10^6/uL


 


Hgb    9.1 L  (12.0-16.0)  g/dL


 


Hct    27.4 L  (37.0-47.0)  %


 


MCV    96.2  (81.0-99.0)  fL


 


MCH    32.0 H  (27.0-31.0)  pg


 


MCHC    33.3  (32.0-36.0)  g/dL


 


RDW    14.4  (12.0-15.0)  %


 


Plt Count    211  (130-450)  10^3/uL


 


MPV    7.2 L  (7.9-10.8)  fL


 


Neut #    4.4  (1.5-6.6)  10^3/uL


 


Lymph #    1.2 L  (1.5-3.5)  10^3/uL


 


Mono #    0.6  (0.0-1.0)  10^3/uL


 


Eos #    0.1  (0.0-0.7)  10^3/uL


 


Baso #    0.0  (0.0-0.1)  10^3/uL


 


Absolute Nucleated RBC    0.00  x10^3/uL


 


Nucleated RBC %    0.0  /100WBC


 


Whole Blood INR     (0.8-1.2)  


 


Sodium   137   (135-145)  mmol/L


 


Potassium   3.9   (3.5-5.0)  mmol/L


 


Chloride   105   (101-111)  mmol/L


 


Carbon Dioxide   24   (21-32)  mmol/L


 


Anion Gap   8.0   (6-13)  


 


BUN   22 H   (6-20)  mg/dL


 


Creatinine   1.2 H   (0.4-1.0)  mg/dL


 


Estimated GFR (MDRD)   42 L   (>89)  


 


Glucose   78   ()  mg/dL


 


Calcium   7.7 L   (8.5-10.3)  mg/dL


 


Total Bilirubin   0.6   (0.2-1.0)  mg/dL


 


AST   17   (10-42)  IU/L


 


ALT   10   (10-60)  IU/L


 


Alkaline Phosphatase   42   ()  IU/L


 


Total Protein   6.6 L   (6.7-8.2)  g/dL


 


Albumin   3.5   (3.2-5.5)  g/dL


 


Globulin   3.1   (2.1-4.2)  g/dL


 


Albumin/Globulin Ratio   1.1   (1.0-2.2)  


 


TSH  1.27    (0.34-5.60)  uIU/mL














  03/09/18 Range/Units





  21:59 


 


WBC   (4.8-10.8)  x10^3/uL


 


RBC   (4.20-5.40)  10^6/uL


 


Hgb   (12.0-16.0)  g/dL


 


Hct   (37.0-47.0)  %


 


MCV   (81.0-99.0)  fL


 


MCH   (27.0-31.0)  pg


 


MCHC   (32.0-36.0)  g/dL


 


RDW   (12.0-15.0)  %


 


Plt Count   (130-450)  10^3/uL


 


MPV   (7.9-10.8)  fL


 


Neut #   (1.5-6.6)  10^3/uL


 


Lymph #   (1.5-3.5)  10^3/uL


 


Mono #   (0.0-1.0)  10^3/uL


 


Eos #   (0.0-0.7)  10^3/uL


 


Baso #   (0.0-0.1)  10^3/uL


 


Absolute Nucleated RBC   x10^3/uL


 


Nucleated RBC %   /100WBC


 


Whole Blood INR  5.3 H*  (0.8-1.2)  


 


Sodium   (135-145)  mmol/L


 


Potassium   (3.5-5.0)  mmol/L


 


Chloride   (101-111)  mmol/L


 


Carbon Dioxide   (21-32)  mmol/L


 


Anion Gap   (6-13)  


 


BUN   (6-20)  mg/dL


 


Creatinine   (0.4-1.0)  mg/dL


 


Estimated GFR (MDRD)   (>89)  


 


Glucose   ()  mg/dL


 


Calcium   (8.5-10.3)  mg/dL


 


Total Bilirubin   (0.2-1.0)  mg/dL


 


AST   (10-42)  IU/L


 


ALT   (10-60)  IU/L


 


Alkaline Phosphatase   ()  IU/L


 


Total Protein   (6.7-8.2)  g/dL


 


Albumin   (3.2-5.5)  g/dL


 


Globulin   (2.1-4.2)  g/dL


 


Albumin/Globulin Ratio   (1.0-2.2)  


 


TSH   (0.34-5.60)  uIU/mL














Assessment/Plan





- Problem List


(1) UTI (urinary tract infection)


Impression: 


Patient presented with generalized weakness and confusion.  Patient had a fall 

at home and then was unable to get up and was increasingly lethargic and 

confused.  She also had poor appetite throughout the day.  Patient complained 

of dysuria and nausea when she was in the emergency department.  Patient did 

have a mild leukocytosis, acute kidney injury and appeared dehydrated.  The 

patient's urine analysis revealed positive leukocyte esterase with greater than 

25 WBCs and bacteria consistent with a urinary tract infection.  Given the 

systemic nature of the urinary tract infection and the patient's age the 

patient was admitted to the medical bah for treatment for UTI with IV 

antibiotics and IV fluids. Culture is in progress for urine and results pending.





Plan:


Is on IV antibiotics with ceftriaxone, day #2


On  IV fluids for hydration


We will await urine culture








Qualifiers: 


   Urinary tract infection type: acute cystitis   Hematuria presence: without 

hematuria   Qualified Code(s): N30.00 - Acute cystitis without hematuria   





(2) Encephalopathy


Conclusion/Plan: 


Patient had lethargy and confusion on presentation to the emergency department 

and earlier today.  It appears that the encephalopathy is likely metabolic 

secondary to patient's urinary tract infection and dehydration with acute 

kidney injury.


We will treat the underlying cause which appears to be infection and 

dehydration with IV antibiotics and IV fluids and we expect that the patient's 

encephalopathy will improve.  If the patient continues to have altered 

mentation or does have some focal deficits then we will need to consider a CT 

of the patient's head especially in the setting of an elevated INR. She is 

getting better. No CT today











(3) YONATAN (acute kidney injury)


Conclusion/Plan: 


Patient has a baseline creatinine of 0.8-1.1 today on presentation the patient'

s creatinine is elevated at 1.9.  Patient also has an elevated BUN of 26 and 

her labs as well as her physical exam are consistent with dehydration.  Patient 

appears likely to have dehydration secondary to urinary tract infection and 

poor appetite throughout the day today. She is 1.2 with treatment.





Plan:


Continue IV fluids


We will avoid nephrotoxic agents including lasix


We will monitor the patient's creatinine








(4) Atrial fibrillation


Conclusion/Plan: 


The patient has a history of atrial fibrillation and did have an embolic stroke 

in 2016.  She is currently on Coumadin at home and diltiazem for rate control.  

Her EKG does not show atrial fibrillation.


Patient's INR on presentation is 5.3





Plan:


Patient's Coumadin will be held until INR drops below 3


We will continue the patient on her home dose of diltiazem


Monitor. Not done today. will order for tomorrow.


Qualifiers: 


   Atrial fibrillation type: paroxysmal   Qualified Code(s): I48.0 - Paroxysmal 

atrial fibrillation   





(5) Hypothyroidism


Conclusion/Plan: 


The patient has history of hypothyroidism and is on Synthroid at home.  

Currently she appears to be stable from the standpoint of hypothyroidism.  We 

will continue her home Synthroid and TSH is 1.7 this am. NO change in meds.


Qualifiers: 


   Hypothyroidism type: unspecified   Qualified Code(s): E03.9 - Hypothyroidism

, unspecified   





(6) Hypertension


Conclusion/Plan: 


Patient has history of hypertension and is on antihypertensives at home on 

presentation to the emergency department the patient's blood pressure was 

elevated however on presentation to the medical bah the patient's blood 

pressure is borderline at 101/39.  We will cautiously continue the patient's 

home antihypertensive medications but we will keep a close eye on her blood 

pressure as if it does drop we will need to stop the antihypertensives and give 

fluid resuscitation in the setting of an infection. Systolic has been 102-120 

today.


Qualifiers: 


   Hypertension type: essential hypertension   Qualified Code(s): I10 - 

Essential (primary) hypertension   





(7) Hyponatremia


Conclusion/Plan: 


Resolved


The patient has hyponatremia on presentation with a sodium of 130.  Patient 

does appear to be dehydrated and this is likely hypovolemic hyponatremia.


Patient will be given IV fluids and will continue to monitor her sodium. 

Improved today to 137.


Hold lasix








(8) Hyperlipidemia


Conclusion/Plan: 


Patient has history of hyperlipidemia and is on a statin at home we will 

continue her on statin while she is hospitalized 


Qualifiers: 


   Hyperlipidemia type: other hyperlipidemia   Qualified Code(s): E78.4 - Other 

hyperlipidemia   





(9) Hyperkalemia


Conclusion/Plan: 


Resolved.


Likely secondary to acute kidney injury


EKG does not show any peaked T waves


We will treat the underlying cause which appears to be acute kidney injury with 

IV fluids and monitor the patient's potassium

## 2018-03-11 LAB
ALBUMIN DIAFP-MCNC: 3.1 G/DL (ref 3.2–5.5)
ALBUMIN/GLOB SERPL: 0.9 {RATIO} (ref 1–2.2)
ALP SERPL-CCNC: 41 IU/L (ref 42–121)
ALT SERPL W P-5'-P-CCNC: < 10 IU/L (ref 10–60)
ANION GAP SERPL CALCULATED.4IONS-SCNC: 8 MMOL/L (ref 6–13)
AST SERPL W P-5'-P-CCNC: 15 IU/L (ref 10–42)
BASOPHILS NFR BLD AUTO: 0 10^3/UL (ref 0–0.1)
BASOPHILS NFR BLD AUTO: 0.9 %
BILIRUB BLD-MCNC: 0.4 MG/DL (ref 0.2–1)
BUN SERPL-MCNC: 15 MG/DL (ref 6–20)
CALCIUM UR-MCNC: 7.7 MG/DL (ref 8.5–10.3)
CHLORIDE SERPL-SCNC: 108 MMOL/L (ref 101–111)
CO2 SERPL-SCNC: 22 MMOL/L (ref 21–32)
CREAT SERPLBLD-SCNC: 0.8 MG/DL (ref 0.4–1)
EOSINOPHIL # BLD AUTO: 0.2 10^3/UL (ref 0–0.7)
EOSINOPHIL NFR BLD AUTO: 5 %
ERYTHROCYTE [DISTWIDTH] IN BLOOD BY AUTOMATED COUNT: 14.1 % (ref 12–15)
GFRSERPLBLD MDRD-ARVRAT: 68 ML/MIN/{1.73_M2} (ref 89–?)
GLOBULIN SER-MCNC: 3.4 G/DL (ref 2.1–4.2)
GLUCOSE SERPL-MCNC: 78 MG/DL (ref 70–100)
HGB UR QL STRIP: 9.4 G/DL (ref 12–16)
INR PPP: 4.4 (ref 0.8–1.2)
LYMPHOCYTES # SPEC AUTO: 1 10^3/UL (ref 1.5–3.5)
LYMPHOCYTES NFR BLD AUTO: 21.1 %
MCH RBC QN AUTO: 32 PG (ref 27–31)
MCHC RBC AUTO-ENTMCNC: 33 G/DL (ref 32–36)
MCV RBC AUTO: 97.2 FL (ref 81–99)
MONOCYTES # BLD AUTO: 0.4 10^3/UL (ref 0–1)
MONOCYTES NFR BLD AUTO: 8.4 %
NEUTROPHILS # BLD AUTO: 3.2 10^3/UL (ref 1.5–6.6)
NEUTROPHILS # SNV AUTO: 4.9 X10^3/UL (ref 4.8–10.8)
NEUTROPHILS NFR BLD AUTO: 64.6 %
PDW BLD AUTO: 7.1 FL (ref 7.9–10.8)
PLATELET # BLD: 213 10^3/UL (ref 130–450)
PROT SPEC-MCNC: 6.5 G/DL (ref 6.7–8.2)
PROTHROM ACT/NOR PPP: 47.4 SECS (ref 9.9–12.6)
RBC MAR: 2.92 10^6/UL (ref 4.2–5.4)
SODIUM SERPLBLD-SCNC: 138 MMOL/L (ref 135–145)

## 2018-03-11 RX ADMIN — SODIUM CHLORIDE, PRESERVATIVE FREE SCH ML: 5 INJECTION INTRAVENOUS at 11:25

## 2018-03-11 RX ADMIN — PANTOPRAZOLE SODIUM SCH MG: 40 TABLET, DELAYED RELEASE ORAL at 06:14

## 2018-03-11 RX ADMIN — SODIUM CHLORIDE SCH MLS/HR: 9 INJECTION, SOLUTION INTRAVENOUS at 09:20

## 2018-03-11 RX ADMIN — LEVOTHYROXINE SODIUM SCH MCG: 0.03 TABLET ORAL at 06:14

## 2018-03-11 RX ADMIN — DEXTROSE MONOHYDRATE SCH MLS/HR: 50 INJECTION, SOLUTION INTRAVENOUS at 23:54

## 2018-03-11 RX ADMIN — SODIUM CHLORIDE SCH MLS/HR: 9 INJECTION, SOLUTION INTRAVENOUS at 18:38

## 2018-03-11 RX ADMIN — SODIUM CHLORIDE, PRESERVATIVE FREE SCH: 5 INJECTION INTRAVENOUS at 16:33

## 2018-03-11 RX ADMIN — Medication SCH MG: at 17:22

## 2018-03-11 RX ADMIN — PRAVASTATIN SODIUM SCH MG: 10 TABLET ORAL at 11:25

## 2018-03-11 RX ADMIN — Medication SCH MG: at 11:25

## 2018-03-11 RX ADMIN — POLYETHYLENE GLYCOL 3350 SCH GM: 17 POWDER, FOR SOLUTION ORAL at 11:25

## 2018-03-11 RX ADMIN — DEXTROSE MONOHYDRATE SCH MLS/HR: 50 INJECTION, SOLUTION INTRAVENOUS at 00:32

## 2018-03-11 NOTE — PROVIDER PROGRESS NOTE
Subjective





- Prog Note Date


Prog Note Date: 03/11/18


Prog Note Time: 16:33





- Subjective


Subjective: 





she is waking up more and more. all of her fam and friends state that even when 

she is well she will sleep like the dead. and w illness more sleepy. when she 

is awake, appropriate with RN and friends adn family.





Current Medications





- Current Medications


Current Medications: 





Active Medications





Acetaminophen (Tylenol)  650 mg PO Q4HR PRN


   PRN Reason: Pain 1 to 4


Acetaminophen/Hydrocodone Bitart (Norco 5/325)  1 tab PO Q4HR PRN


   PRN Reason: Pain 5 to 7


Diltiazem HCl (Cardizem Cd)  240 mg PO DAILY [facility]


   Last Admin: 03/11/18 11:25 Dose:  240 mg


Sodium Chloride (Normal Saline 0.9%)  1,000 mls @ 100 mls/hr IV .Q10H [facility]


   Last Admin: 03/11/18 09:20 Dose:  100 mls/hr


Ceftriaxone Sodium 1 gm/ (Sodium Chloride)  100 mls @ 200 mls/hr IV Q24H [facility]


   Last Infusion: 03/11/18 01:27 Dose:  Infused


Levothyroxine Sodium (Synthroid)  25 mcg PO QDAC [facility]


   Last Admin: 03/11/18 06:14 Dose:  25 mcg


Ondansetron HCl (Zofran Inj)  4 mg IVP Q6HR PRN


   PRN Reason: Nausea / Vomiting


Pantoprazole Sodium (Protonix)  40 mg PO QDAC [facility]


   Last Admin: 03/11/18 06:14 Dose:  40 mg


Polyethylene Glycol (Miralax)  17 gm PO DAILY [facility]


   Last Admin: 03/11/18 11:25 Dose:  17 gm


Pravastatin Sodium (Pravachol)  10 mg PO DAILY [facility]


   Last Admin: 03/11/18 11:25 Dose:  10 mg


Prochlorperazine Edisylate (Compazine Inj)  10 mg IVP Q6HR PRN


   PRN Reason: Nausea / Vomiting


Saccharomyces Boulardii (Florastor)  250 mg PO BIDWM [facility]


   Last Admin: 03/11/18 11:25 Dose:  250 mg


Sodium Chloride (Normal Saline Flush 0.9%)  10 ml IVP PRN PRN


   PRN Reason: AS NEEDED PER PROVIDER ORDERS


Sodium Chloride (Normal Saline Flush 0.9%)  10 ml IVP 0100,0900,1700 [facility]


   Last Admin: 03/11/18 11:25 Dose:  10 ml





 





Diltiazem HCl [Diltiazem 24Hr ER] 240 mg PO DAILY 08/14/14 


Furosemide [Lasix] 20 mg PO DAILY 08/14/14 


Levothyroxine [Synthroid] 25 mcg PO QPM 08/14/14 


Potassium Chloride 10 meq PO DAILY 08/14/14 


Warfarin [Coumadin] 1.5 mg PO DAILY 11/08/16 


Atorvastatin Calcium 10 mg PO QPM 03/10/18 











Objective





- Vital Signs/Intake & Output


Reviewed Vital Signs: Yes


Vital Signs: 


 Vital Signs x48h











  Temp Pulse Resp BP BP Pulse Ox


 


 03/11/18 16:00  36.5 C  78  20  103/46 L   99


 


 03/11/18 15:29       99


 


 03/11/18 09:35  37.0 C  65  18   131/64 H  99











Intake & Output: 


 Intake & Output











 03/08/18 03/09/18 03/10/18 03/12/18





 23:59 23:59 23:59 00:59


 


Intake Total   2600 1700


 


Output Total  200  


 


Balance  -200 2600 1700














- Objective


General Appearance: positive: No acute distress, Other (sleeping, and even in 

sleep furrowed brow, wakes and smiles but falls asleep immediately)


Eyes Bilateral: positive: PERRL, EOMI


Neck: positive: No JVD.  negative: Stiff neck, Carotid bruit


Respiratory: positive: Chest non-tender.  negative: Wheezes, Rales, Rhonchi


Cardiovascular: positive: Regular rate & rhythm.  negative: Gallop/S4, Friction 

rub


Abdomen: positive: Non-tender, No organomegaly, Nml bowel sounds, No distention


Skin: positive: Warm, Dry


Extremities: positive: No pedal edema


Neurologic/Psychiatric: positive: Disoriented to time, Slurred/abnml speech (

hesitant, as if words coming slowly)





- Lab Results


Fish Bones: 


 03/11/18 05:05





 03/11/18 05:05


Other Labs: 


 Lab Results x24hrs











  03/11/18 03/11/18 03/11/18 Range/Units





  05:05 05:05 05:05 


 


WBC    4.9  (4.8-10.8)  x10^3/uL


 


RBC    2.92 L  (4.20-5.40)  10^6/uL


 


Hgb    9.4 L  (12.0-16.0)  g/dL


 


Hct    28.4 L  (37.0-47.0)  %


 


MCV    97.2  (81.0-99.0)  fL


 


MCH    32.0 H  (27.0-31.0)  pg


 


MCHC    33.0  (32.0-36.0)  g/dL


 


RDW    14.1  (12.0-15.0)  %


 


Plt Count    213  (130-450)  10^3/uL


 


MPV    7.1 L  (7.9-10.8)  fL


 


Neut #    3.2  (1.5-6.6)  10^3/uL


 


Lymph #    1.0 L  (1.5-3.5)  10^3/uL


 


Mono #    0.4  (0.0-1.0)  10^3/uL


 


Eos #    0.2  (0.0-0.7)  10^3/uL


 


Baso #    0.0  (0.0-0.1)  10^3/uL


 


Absolute Nucleated RBC    0.01  x10^3/uL


 


Nucleated RBC %    0.1  /100WBC


 


PT  47.4 H    (9.9-12.6)  secs


 


INR  4.4 H    (0.8-1.2)  


 


Sodium   138   (135-145)  mmol/L


 


Potassium   3.5   (3.5-5.0)  mmol/L


 


Chloride   108   (101-111)  mmol/L


 


Carbon Dioxide   22   (21-32)  mmol/L


 


Anion Gap   8.0   (6-13)  


 


BUN   15   (6-20)  mg/dL


 


Creatinine   0.8   (0.4-1.0)  mg/dL


 


Estimated GFR (MDRD)   68 L   (>89)  


 


Glucose   78   ()  mg/dL


 


Calcium   7.7 L   (8.5-10.3)  mg/dL


 


Total Bilirubin   0.4   (0.2-1.0)  mg/dL


 


AST   15   (10-42)  IU/L


 


ALT   < 10 L   (10-60)  IU/L


 


Alkaline Phosphatase   41 L   ()  IU/L


 


Total Protein   6.5 L   (6.7-8.2)  g/dL


 


Albumin   3.1 L   (3.2-5.5)  g/dL


 


Globulin   3.4   (2.1-4.2)  g/dL


 


Albumin/Globulin Ratio   0.9 L   (1.0-2.2)  














Assessment/Plan





- Problem List


(1) UTI (urinary tract infection)


Impression: 


Patient presented with generalized weakness and confusion.  Patient had a fall 

at home and then was unable to get up and was increasingly lethargic and 

confused.  She also had poor appetite throughout the day.  Patient complained 

of dysuria and nausea when she was in the emergency department.  Patient did 

have a mild leukocytosis, acute kidney injury and appeared dehydrated.  The 

patient's urine analysis revealed positive leukocyte esterase with greater than 

25 WBCs and bacteria consistent with a urinary tract infection.  Given the 

systemic nature of the urinary tract infection and the patient's age the 

patient was admitted to the medical bah for treatment for UTI with IV 

antibiotics and IV fluids. Culture is in progress for urine and so far it's a 

gram negative micheline





Plan:


Is on IV antibiotics with ceftriaxone, day #3


On  IV fluids for hydration


We will await urine culture








Qualifiers: 


   Urinary tract infection type: acute cystitis   Hematuria presence: without 

hematuria   Qualified Code(s): N30.00 - Acute cystitis without hematuria   





(2) Encephalopathy


Conclusion/Plan: Improving


Patient had lethargy and confusion on presentation to the emergency department 

and into the night of admisson and the next day.  It appears that the 

encephalopathy is likely metabolic secondary to patient's urinary tract 

infection and dehydration with acute kidney injury.


We will treat the underlying cause which appears to be infection and 

dehydration with IV antibiotics and IV fluids and we expect that the patient's 

encephalopathy will improve.  If the patient continues to have altered 

mentation or does have some focal deficits then we will need to consider a CT 

of the patient's head especially in the setting of an elevated INR. She is 

getting better but every so slowly. Will order CT of head, noncontrast.











(3) YONATAN (acute kidney injury)


Conclusion/Plan: Resolved


Patient has a baseline creatinine of 0.8-1.1 On presentation the patient's 

creatinine is elevated at 1.9.  Patient also has an elevated BUN of 26 and her 

labs as well as her physical exam are consistent with dehydration.  Patient 

appears likely to have dehydration secondary to urinary tract infection and 

poor appetite throughout the day today. She was 1.2 with treatment yesterday 

and today is 0.8 so now normal.





Plan:


Continue IV fluids


We will avoid nephrotoxic agents including lasix


We will monitor the patient's creatinine








(4) Atrial fibrillation


Conclusion/Plan: 


The patient has a history of atrial fibrillation and did have an embolic stroke 

in 2016.  She is currently on Coumadin at home and diltiazem for rate control.  

Her EKG does not show atrial fibrillation.


Patient's INR on presentation is 5.3 and today 4.4





Plan:


Patient's Coumadin will be held until INR drops below 3


We will continue the patient on her home dose of diltiazem


Monitor. Not done today. will order for tomorrow.


Qualifiers: 


   Atrial fibrillation type: paroxysmal   Qualified Code(s): I48.0 - Paroxysmal 

atrial fibrillation   





(5) Hypothyroidism


Conclusion/Plan: 


The patient has history of hypothyroidism and is on Synthroid at home.  

Currently she appears to be stable from the standpoint of hypothyroidism.  We 

will continue her home Synthroid and TSH is 1.7 this am. NO change in meds.


Qualifiers: 


   Hypothyroidism type: unspecified   Qualified Code(s): E03.9 - Hypothyroidism

, unspecified   





(6) Hypertension


Conclusion/Plan: 


Patient has history of hypertension and is on antihypertensives at home on 

presentation to the emergency department the patient's blood pressure was 

elevated however on presentation to the medical bah the patient's blood 

pressure is borderline at 101/39.  We will cautiously continue the patient's 

home antihypertensive medications but we will keep a close eye on her blood 

pressure as if it does drop we will need to stop the antihypertensives and give 

fluid resuscitation in the setting of an infection. Systolic has been 103-131 

today.


Qualifiers: 


   Hypertension type: essential hypertension   Qualified Code(s): I10 - 

Essential (primary) hypertension   





(7) Hyponatremia


Conclusion/Plan: 


Resolved


The patient has hyponatremia on presentation with a sodium of 130.  Patient 

does appear to be dehydrated and this is likely hypovolemic hyponatremia.


Patient will be given IV fluids and will continue to monitor her sodium. 

Improved today to 137.


Hold lasix








(8) Hyperlipidemia


Conclusion/Plan: 


Patient has history of hyperlipidemia and is on a statin at home we will 

continue her on statin while she is hospitalized 


Qualifiers: 


   Hyperlipidemia type: other hyperlipidemia   Qualified Code(s): E78.4 - Other 

hyperlipidemia   





(9) Hyperkalemia


Conclusion/Plan: 


Resolved.


Likely secondary to acute kidney injury


EKG does not show any peaked T waves


We will treat the underlying cause which appears to be acute kidney injury with 

IV fluids and monitor the patient's potassium

## 2018-03-12 LAB
ALBUMIN DIAFP-MCNC: 3.1 G/DL (ref 3.2–5.5)
ALBUMIN/GLOB SERPL: 1 {RATIO} (ref 1–2.2)
ALP SERPL-CCNC: 37 IU/L (ref 42–121)
ALT SERPL W P-5'-P-CCNC: 13 IU/L (ref 10–60)
ANION GAP SERPL CALCULATED.4IONS-SCNC: 9 MMOL/L (ref 6–13)
AST SERPL W P-5'-P-CCNC: 20 IU/L (ref 10–42)
BASOPHILS NFR BLD AUTO: 0.1 10^3/UL (ref 0–0.1)
BASOPHILS NFR BLD AUTO: 0.7 %
BILIRUB BLD-MCNC: 0.5 MG/DL (ref 0.2–1)
BUN SERPL-MCNC: 12 MG/DL (ref 6–20)
CALCIUM UR-MCNC: 7.6 MG/DL (ref 8.5–10.3)
CHLORIDE SERPL-SCNC: 107 MMOL/L (ref 101–111)
CO2 SERPL-SCNC: 23 MMOL/L (ref 21–32)
CREAT SERPLBLD-SCNC: 0.8 MG/DL (ref 0.4–1)
EOSINOPHIL # BLD AUTO: 0.2 10^3/UL (ref 0–0.7)
EOSINOPHIL NFR BLD AUTO: 3.7 %
ERYTHROCYTE [DISTWIDTH] IN BLOOD BY AUTOMATED COUNT: 13.9 % (ref 12–15)
GFRSERPLBLD MDRD-ARVRAT: 68 ML/MIN/{1.73_M2} (ref 89–?)
GLOBULIN SER-MCNC: 3.1 G/DL (ref 2.1–4.2)
GLUCOSE SERPL-MCNC: 84 MG/DL (ref 70–100)
HGB UR QL STRIP: 9.2 G/DL (ref 12–16)
INR PPP: 3.4 (ref 0.8–1.2)
LYMPHOCYTES # SPEC AUTO: 0.9 10^3/UL (ref 1.5–3.5)
LYMPHOCYTES NFR BLD AUTO: 13.1 %
MCH RBC QN AUTO: 32.7 PG (ref 27–31)
MCHC RBC AUTO-ENTMCNC: 33.7 G/DL (ref 32–36)
MCV RBC AUTO: 97 FL (ref 81–99)
MONOCYTES # BLD AUTO: 0.5 10^3/UL (ref 0–1)
MONOCYTES NFR BLD AUTO: 7.4 %
NEUTROPHILS # BLD AUTO: 5.1 10^3/UL (ref 1.5–6.6)
NEUTROPHILS # SNV AUTO: 6.8 X10^3/UL (ref 4.8–10.8)
NEUTROPHILS NFR BLD AUTO: 75.1 %
PDW BLD AUTO: 7.2 FL (ref 7.9–10.8)
PLATELET # BLD: 205 10^3/UL (ref 130–450)
PROT SPEC-MCNC: 6.2 G/DL (ref 6.7–8.2)
PROTHROM ACT/NOR PPP: 36.9 SECS (ref 9.9–12.6)
RBC MAR: 2.8 10^6/UL (ref 4.2–5.4)
SODIUM SERPLBLD-SCNC: 139 MMOL/L (ref 135–145)

## 2018-03-12 RX ADMIN — IPRATROPIUM BROMIDE AND ALBUTEROL SULFATE PRN ML: 2.5; .5 SOLUTION RESPIRATORY (INHALATION) at 05:17

## 2018-03-12 RX ADMIN — SODIUM CHLORIDE, PRESERVATIVE FREE SCH ML: 5 INJECTION INTRAVENOUS at 17:45

## 2018-03-12 RX ADMIN — PRAVASTATIN SODIUM SCH MG: 10 TABLET ORAL at 09:20

## 2018-03-12 RX ADMIN — SENNOSIDES SCH MG: 8.6 TABLET, FILM COATED ORAL at 09:19

## 2018-03-12 RX ADMIN — Medication SCH MG: at 09:20

## 2018-03-12 RX ADMIN — PANTOPRAZOLE SODIUM SCH MG: 40 TABLET, DELAYED RELEASE ORAL at 06:16

## 2018-03-12 RX ADMIN — IPRATROPIUM BROMIDE AND ALBUTEROL SULFATE PRN ML: 2.5; .5 SOLUTION RESPIRATORY (INHALATION) at 17:05

## 2018-03-12 RX ADMIN — SODIUM CHLORIDE, PRESERVATIVE FREE SCH: 5 INJECTION INTRAVENOUS at 01:04

## 2018-03-12 RX ADMIN — Medication SCH MG: at 17:44

## 2018-03-12 RX ADMIN — SODIUM CHLORIDE, PRESERVATIVE FREE SCH: 5 INJECTION INTRAVENOUS at 10:55

## 2018-03-12 RX ADMIN — POLYETHYLENE GLYCOL 3350 SCH GM: 17 POWDER, FOR SOLUTION ORAL at 09:19

## 2018-03-12 RX ADMIN — LEVOTHYROXINE SODIUM SCH MCG: 0.03 TABLET ORAL at 06:16

## 2018-03-12 RX ADMIN — DOCUSATE SODIUM SCH MG: 250 CAPSULE, LIQUID FILLED ORAL at 09:20

## 2018-03-12 RX ADMIN — SODIUM CHLORIDE SCH MLS/HR: 9 INJECTION, SOLUTION INTRAVENOUS at 05:08

## 2018-03-12 NOTE — CT REPORT
EXAM:

CT HEAD

 

EXAM DATE: 3/11/2018 10:55 PM.

 

CLINICAL HISTORY: Altered mental status after a fall.

 

COMPARISON: Brain CT from 11/14/2016.

 

TECHNIQUE: Multiaxial CT images were obtained from the foramen magnum to the vertex. Reformats: Coron
al. IV contrast: None. 

 

In accordance with CT protocol optimization, one or more of the following dose reduction techniques w
ere utilized for this exam: automated exposure control, adjustment of mA and/or KV based on patient s
ize, or use of iterative reconstructive technique.

 

FINDINGS:

Parenchyma: No intraparenchymal hemorrhage. No evidence of mass, midline shift, or CT findings of acu
te infarction. Encephalomalacia in the superior left cerebellum is stable. Gray-white differentiation
 is distinct. Moderate diffuse chronic microangiopathic white matter changes are evident.

 

Extraaxial Spaces: No acute subdural or epidural collections identified.

 

Ventricles: The ventricles and cortical sulci are moderately enlarged, consistent with age-related ti
ssue loss.

 

Sinuses and Orbits: Imaged paranasal sinuses, orbits, and mastoids show no significant abnormality.

 

Bones: No evidence of fracture or calvarial defect.

 

Other: Extensive intracranial atherosclerosis is noted.

 

IMPRESSION:

Generalized age-related cortical atrophic changes without evidence of acute intracranial abnormality.


 

[facility]

Referring Provider Line: [phone]

 

SITE ID: [id number]

## 2018-03-12 NOTE — PROVIDER PROGRESS NOTE
Subjective





- Prog Note Date


Prog Note Date: 03/12/18


Prog Note Time: 11:25





- Subjective


Pt reports feeling: Improved


Subjective: 





she's more awake, eating. but much less strength than baseline. 


denies pain. 


denies sob


CT of head was negative.





Current Medications





- Current Medications


Current Medications: 





Active Medications





Acetaminophen (Tylenol)  650 mg PO Q4HR PRN


   PRN Reason: Pain 1 to 4


Acetaminophen/Hydrocodone Bitart (Norco 5/325)  1 tab PO Q4HR PRN


   PRN Reason: Pain 5 to 7


Albuterol/Ipratropium (Duoneb)  3 ml INH RTQID PRN


   PRN Reason: Shortness of Air/Wheezing


   Last Admin: 03/12/18 05:17 Dose:  3 ml


Diltiazem HCl (Cardizem Cd)  240 mg PO DAILY [facility]ly [facility]


   Last Admin: 03/12/18 09:21 Dose:  240 mg


Docusate Sodium (Colace 250mg Capsule)  250 - 500 mg PO DAILY [facility]


   Last Admin: 03/12/18 09:20 Dose:  500 mg


Sodium Chloride (Normal Saline 0.9%)  1,000 mls @ 100 mls/hr IV .Q10H [facility]


   Last Admin: 03/12/18 05:08 Dose:  100 mls/hr


Ceftriaxone Sodium 1 gm/ (Sodium Chloride)  100 mls @ 200 mls/hr IV Q24H [facility]


   Last Infusion: 03/12/18 00:30 Dose:  Infused


Levothyroxine Sodium (Synthroid)  25 mcg PO QDAC [facility]


   Last Admin: 03/12/18 06:16 Dose:  25 mcg


Ondansetron HCl (Zofran Inj)  4 mg IVP Q6HR PRN


   PRN Reason: Nausea / Vomiting


Pantoprazole Sodium (Protonix)  40 mg PO QDAC [facility]


   Last Admin: 03/12/18 06:16 Dose:  40 mg


Polyethylene Glycol (Miralax)  17 gm PO DAILY [facility]


   Last Admin: 03/12/18 09:19 Dose:  17 gm


Pravastatin Sodium (Pravachol)  10 mg PO DAILY [facility]


   Last Admin: 03/12/18 09:20 Dose:  10 mg


Prochlorperazine Edisylate (Compazine Inj)  10 mg IVP Q6HR PRN


   PRN Reason: Nausea / Vomiting


Saccharomyces Boulardii (Florastor)  250 mg PO BIDWM [facility]


   Last Admin: 03/12/18 09:20 Dose:  250 mg


Senna (Senokot)  8.6 - 17.2 mg PO DAILY [facility]


   Last Admin: 03/12/18 09:19 Dose:  17.2 mg


Sodium Chloride (Normal Saline Flush 0.9%)  10 ml IVP PRN PRN


   PRN Reason: AS NEEDED PER PROVIDER ORDERS


Sodium Chloride (Normal Saline Flush 0.9%)  10 ml IVP 0100,0900,1700 [facility]


   Last Admin: 03/12/18 10:55 Dose:  Not Given





 





Diltiazem HCl [Diltiazem 24Hr ER] 240 mg PO DAILY 08/14/14 


Furosemide [Lasix] 20 mg PO DAILY 08/14/14 


Levothyroxine [Synthroid] 25 mcg PO QPM 08/14/14 


Potassium Chloride 10 meq PO DAILY 08/14/14 


Warfarin [Coumadin] 1.5 mg PO DAILY 11/08/16 


Atorvastatin Calcium 10 mg PO QPM 03/10/18 











Objective





- Vital Signs/Intake & Output


Vital Signs: 


 Vital Signs x48h











  Temp Pulse Pulse Resp BP Pulse Ox


 


 03/12/18 08:37  36.8 C   91  20  153/84 H  98


 


 03/12/18 06:19  36.6 C   65  20  126/65  99


 


 03/12/18 05:10   83   20  











Intake & Output: 


 Intake & Output











 03/09/18 03/10/18 03/11/18 03/12/18





 22:59 22:59 23:59 23:59


 


Intake Total    1101.667


 


Output Total    


 


Balance    1101.667














- Objective


General Appearance: positive: No acute distress, Alert


Eyes Bilateral: positive: PERRL, EOMI


Neck: positive: No JVD.  negative: Stiff neck, Carotid bruit


Respiratory: positive: Chest non-tender, No respiratory distress.  negative: 

Wheezes, Rales, Rhonchi


Cardiovascular: positive: Regular rate & rhythm.  negative: Gallop/S4, Friction 

rub


Abdomen: positive: Non-tender, No organomegaly, Nml bowel sounds, No distention


Skin: positive: Warm, Dry


Extremities: positive: Non-tender, Pedal edema (mild around ankles)


Neurologic/Psychiatric: positive: CN's nml (2-12), Motor nml (weak and needs a 

gait belt w walker), Disoriented to place, Disoriented to time





- Lab Results


Fish Bones: 


 03/12/18 05:05





 03/12/18 05:05


Other Labs: 


 Lab Results x24hrs











  03/12/18 03/12/18 03/12/18 Range/Units





  05:05 05:05 05:05 


 


WBC    6.8  (4.8-10.8)  x10^3/uL


 


RBC    2.80 L  (4.20-5.40)  10^6/uL


 


Hgb    9.2 L  (12.0-16.0)  g/dL


 


Hct    27.2 L  (37.0-47.0)  %


 


MCV    97.0  (81.0-99.0)  fL


 


MCH    32.7 H  (27.0-31.0)  pg


 


MCHC    33.7  (32.0-36.0)  g/dL


 


RDW    13.9  (12.0-15.0)  %


 


Plt Count    205  (130-450)  10^3/uL


 


MPV    7.2 L  (7.9-10.8)  fL


 


Neut #    5.1  (1.5-6.6)  10^3/uL


 


Lymph #    0.9 L  (1.5-3.5)  10^3/uL


 


Mono #    0.5  (0.0-1.0)  10^3/uL


 


Eos #    0.2  (0.0-0.7)  10^3/uL


 


Baso #    0.1  (0.0-0.1)  10^3/uL


 


Absolute Nucleated RBC    0.00  x10^3/uL


 


Nucleated RBC %    0.1  /100WBC


 


PT  36.9 H    (9.9-12.6)  secs


 


INR  3.4 H    (0.8-1.2)  


 


Sodium   139   (135-145)  mmol/L


 


Potassium   3.6   (3.5-5.0)  mmol/L


 


Chloride   107   (101-111)  mmol/L


 


Carbon Dioxide   23   (21-32)  mmol/L


 


Anion Gap   9.0   (6-13)  


 


BUN   12   (6-20)  mg/dL


 


Creatinine   0.8   (0.4-1.0)  mg/dL


 


Estimated GFR (MDRD)   68 L   (>89)  


 


Glucose   84   ()  mg/dL


 


Calcium   7.6 L   (8.5-10.3)  mg/dL


 


Total Bilirubin   0.5   (0.2-1.0)  mg/dL


 


AST   20   (10-42)  IU/L


 


ALT   13   (10-60)  IU/L


 


Alkaline Phosphatase   37 L   ()  IU/L


 


Total Protein   6.2 L   (6.7-8.2)  g/dL


 


Albumin   3.1 L   (3.2-5.5)  g/dL


 


Globulin   3.1   (2.1-4.2)  g/dL


 


Albumin/Globulin Ratio   1.0   (1.0-2.2)  














Assessment/Plan





- Problem List


(1) UTI (urinary tract infection)


Impression: 


Patient presented with generalized weakness and confusion.  Patient had a fall 

at home and then was unable to get up and was increasingly lethargic and 

confused.  She also had poor appetite throughout the day.  Patient complained 

of dysuria and nausea when she was in the emergency department.  Patient did 

have a mild leukocytosis, acute kidney injury and appeared dehydrated.  The 

patient's urine analysis revealed positive leukocyte esterase with greater than 

25 WBCs and bacteria consistent with a urinary tract infection.  Given the 

systemic nature of the urinary tract infection and the patient's age the 

patient was admitted to the medical bah for treatment for UTI with IV 

antibiotics and IV fluids. Culture is in progress for urine and it's 

Citrobacter koseri. Sensitive to rocephin and resistant to piperacillin. 





Plan:


Is on IV antibiotics with ceftriaxone, day #4


Stop ceftriaxone, change to Keflex po


On  IV fluids for hydration but is eating 100% and taking po well so will stop 

IVF and IV lock











Qualifiers: 


   Urinary tract infection type: acute cystitis   Hematuria presence: without 

hematuria   Qualified Code(s): N30.00 - Acute cystitis without hematuria   





(2) Encephalopathy


Conclusion/Plan: Improving


Patient had lethargy and confusion on presentation to the emergency department 

and into the night of admisson and the next day.  It appears that the 

encephalopathy is likely metabolic secondary to patient's urinary tract 

infection and dehydration with acute kidney injury.


We will treat the underlying cause which appears to be infection and 

dehydration with IV antibiotics and IV fluids and we expect that the patient's 

encephalopathy will improve.  If the patient continues to have altered 

mentation or does have some focal deficits then we will need to consider a CT 

of the patient's head especially in the setting of an elevated INR. She is 

getting better but every so slowly. Noncontrast CT of head was without subdural 

last night. 


she is continuing to improve today. almost at baseline. Because of weakness she 

is working with PT. May need SNF for rehab.











(3) YONATAN (acute kidney injury)


Conclusion/Plan: Resolved


Patient has a baseline creatinine of 0.8-1.1 On presentation the patient's 

creatinine is elevated at 1.9.  Patient also has an elevated BUN of 26 and her 

labs as well as her physical exam are consistent with dehydration.  Patient 

appears likely to have dehydration secondary to urinary tract infection and 

poor appetite throughout the day today. She was 1.2 with treatment yesterday 

and by 3/11 was 0.8 so now normal.





Plan:


Stop IVF


We will avoid nephrotoxic agents including lasix


We will monitor the patient's creatinine








(4) Atrial fibrillation


Conclusion/Plan: 


The patient has a history of atrial fibrillation and did have an embolic stroke 

in 2016.  She is currently on Coumadin at home and diltiazem for rate control.  

Her EKG does not show atrial fibrillation.


Patient's INR on presentation is 5.3 and today 3.4





Plan:


Patient's Coumadin will be held until INR drops below 3


We will continue the patient on her home dose of diltiazem





Qualifiers: 


   Atrial fibrillation type: paroxysmal   Qualified Code(s): I48.0 - Paroxysmal 

atrial fibrillation   





(5) Hypothyroidism


Conclusion/Plan: 


The patient has history of hypothyroidism and is on Synthroid at home.  

Currently she appears to be stable from the standpoint of hypothyroidism.  We 

will continue her home Synthroid and TSH is 1.7 this am. NO change in meds.


Qualifiers: 


   Hypothyroidism type: unspecified   Qualified Code(s): E03.9 - Hypothyroidism

, unspecified   





(6) Hypertension


Conclusion/Plan: 


Patient has history of hypertension and is on antihypertensives at home on 

presentation to the emergency department the patient's blood pressure was 

elevated however on presentation to the medical bah the patient's blood 

pressure is borderline at 101/39.  We will cautiously continue the patient's 

home antihypertensive medications but we will keep a close eye on her blood 

pressure as if it does drop we will need to stop the antihypertensives and give 

fluid resuscitation in the setting of an infection. Systolic has been 151-153 

since last night. 


Qualifiers: 


   Hypertension type: essential hypertension   Qualified Code(s): I10 - 

Essential (primary) hypertension   





(7) Hyponatremia


Conclusion/Plan: 


Resolved


The patient has hyponatremia on presentation with a sodium of 130.  Patient 

does appear to be dehydrated and this is likely hypovolemic hyponatremia.


Patient will be given IV fluids and will continue to monitor her sodium. 

Improved to 137 on 3/11


Hold lasix








(8) Hyperlipidemia


Conclusion/Plan: 


Patient has history of hyperlipidemia and is on a statin at home we will 

continue her on statin while she is hospitalized 


Qualifiers: 


   Hyperlipidemia type: other hyperlipidemia   Qualified Code(s): E78.4 - Other 

hyperlipidemia   





(9) Hyperkalemia


Conclusion/Plan: 


Resolved.


Likely secondary to acute kidney injury


EKG does not show any peaked T waves


We will treat the underlying cause which appears to be acute kidney injury with 

IV fluids and monitor the patient's potassium


She was 5.5 on admission and has been normal since 3/9 and today.

## 2018-03-12 NOTE — CT PRELIMINARY REPORT
Accession: [id number]

Exam: CT HEAD W/O

 

IMPRESSION: Generalized age-related cortical atrophic changes without evidence of acute intracranial 
abnormality.

 

RADIA

 

SITE ID: [id number]

## 2018-03-12 NOTE — DISCHARGE PLAN
Discharge Plan for SNF / [facility]





- DC Plan and Transition Orders


SNF Transition Orders: 





Admit to: Care [facility] under the care of Dr. Padilla.





Discharge Diagnosis:  


[]





Medicare Certification: I certify that [facility] skilled nursing care is 

medically necessary on a continuing basis for any of the conditions for which 

she/he is receiving care during hospitalization.





 Notify PCP of admission and forward orders to primary provider for signature.





Weight on admission and [Daily/Weekly/Monthly].  Call PCP immediately if weight 

increases by [Number] pounds or if patient develops dyspnea, chest pain/

tightness or edema.





House Bowel Program: [Yes/No]


If no BM after 2 days, nurse may give M.O.M. 30ml PO PRN and /or ducolax Supp 1 

ND and /or KATIA 250mg P.O., and/or senna 1-2 tabs PO.  On day 3 nurse may give 

repeat above order until residents constipation is resolved. 





Immunizations:





Annual Influenza Vaccine: [Yes/No].


(between Sept 1st and March 31st.) Unless allergy or already given





Two-Step PPD: [Yes/No]


per [facility] [phone] or appropriate documentation of approved exceptions


   


Treatments & Other Orders: []   





Oxygen Orders: []      





Lab Tests or X-Rays Orders: []





Orthopedic Orders: [Remove Sutures/Staples and Comment].








Medications:





PLEASE REFER TO THE DISCHARGE MEDICATION LIST.








Insulin Orders? [Yes/No]





Diagnosis: Diabetes


Initiate hypo and hyperglycemia protocols for BG <70 and BG >375.  May check BG 

prn for signs/symptoms of dysglycemia.





Frequency of BG checks: [AC/Meal/HS]





Basal Insulin:


   


   [] Lantus  100 units / ml inject subq as follows: []


   [] Other: []





Correction Insulin: -  Select the type of insulin below





   [Choose: Novolog/Humalog]100 units /ml insulin inject subq per orders 

indicate below














[] LOW DOSE


  [] MODERATE DOSE


  [] MODERATE/HIGH     


       DOSE [] HIGH DOSE


 


 


    GB               UNITS       GB               UNITS         GB             

  UNITS    GB               UNITS


 


 


         0 UNITS          0 UNITS          0 UNITS     

     0 UNITS


 


 


141-175       1 UNITS 141-175       1 UNITS 141-175       2 UNITS 141-175       

3 UNITS


 


 


176-225       2 UNITS 176-225       3 UNITS 176-225       4 UNITS 176-225       

5 UNITS


 


 


226-275       3 UNITS 226-275       5 UNITS 226-275       6 UNITS 226-275       

7 UNITS


 


 


276-325       4 UNITS 276-325       7 UNITS 276-325       8 UNITS 276-325       

9 UNITS


 


 


326-375       5 UNITS 326-375       9 UNITS 326-375      10 UNITS 326-375      

11 UNITS


 


 


>375    CONTACT MD >375    CONTACT MD >375    CONTACT MD >375    CONTACT MD


 











Custom Dosing: 





   [Choose: None/Novolog/Humalog] 100 units/ml Insulin inject subq as follows:











   GB    Units


 


 [] Units


 


141-175 [] Units


 


176-225 [] Units


 


226-275 [] Units


 


276-325 []Units


 


326-375 [] Units


 


>375 Contact MD

















- Diet


Type: Geriatric


Texture: Regular


Liquids: Thin


May have monthly special meal: Yes





- Therapies | Activity


Therapy: Evaluation | Treat if indicated: PT, OT


Rehabilitation Potential: Maximize functional status, Return to independent 

living


Activity: Activity as Tolerated


Weight Bearing: Full Weight





<Jessica Rodriguez - Last Filed: 03/13/18 11:04>





- DC Plan and Transition Orders


SNF Transition Orders: 





Admit to: [facility] under the care of Gisselle Carbajal MD





Discharge Diagnosis:  


generalized weakness after illness


UTI w Citrobacter koseri: resistant to piperacillin. Needs Keflex until 3/16/18


metabolic encephalopathy resolved: CT head negative


HTN


Hx of CVA w cognitive deficits


chronic atrial fibrillation. resume coumadin when INR 3 or less.


CKD w acute YONATAN (resolved)


Hyperlipidemia


Hypothyroid





Medicare Certification: I certify that [facility] skilled nursing care is 

medically necessary on a continuing basis for any of the conditions for which 

she/he is receiving care during hospitalization.





 Notify PCP of admission and forward orders to primary provider for signature.





Weight on admission and weekly.  Call PCP immediately if weight increases by 5 

pounds or if patient develops dyspnea, chest pain/tightness or edema.





House Bowel Program: yes


If no BM after 2 days, nurse may give M.O.M. 30ml PO PRN and /or ducolax Supp 1 

ND and /or KATIA 250mg P.O., and/or senna 1-2 tabs PO.  On day 3 nurse may give 

repeat above order until residents constipation is resolved. 





Immunizations:





Annual Influenza Vaccine: yes.


(between Sept 1st and March 31st.) Unless allergy or already given





Two-Step PPD: yes


per [facility] [phone] or appropriate documentation of approved exceptions


   


Treatments & Other Orders:    





Oxygen Orders: keep O2 sats 92% with prn nasal canula O2      





Lab Tests or X-Rays Orders: INR daily until coumadin resumed. CBC, UA, BMP in 1 

week





Orthopedic Orders: none.








Medications:





PLEASE REFER TO THE DISCHARGE MEDICATION LIST.








Insulin Orders? no





Diagnosis:  No Diabetes


Initiate hypo and hyperglycemia protocols for BG <70 and BG >375.  May check BG 

prn for signs/symptoms of dysglycemia.





Frequency of BG checks: [AC/Meal/HS]





Basal Insulin:


   


    Lantus  100 units / ml inject subq as follows: []


   [] Other: []





Correction Insulin: -  Select the type of insulin below





   [Choose: Novolog/Humalog]100 units /ml insulin inject subq per orders 

indicate below














[] LOW DOSE


  [] MODERATE DOSE


  [] MODERATE/HIGH     


       DOSE [] HIGH DOSE


 


 


    GB               UNITS       GB               UNITS         GB             

  UNITS    GB               UNITS


 


 


         0 UNITS          0 UNITS          0 UNITS     

     0 UNITS


 


 


141-175       1 UNITS 141-175       1 UNITS 141-175       2 UNITS 141-175       

3 UNITS


 


 


176-225       2 UNITS 176-225       3 UNITS 176-225       4 UNITS 176-225       

5 UNITS


 


 


226-275       3 UNITS 226-275       5 UNITS 226-275       6 UNITS 226-275       

7 UNITS


 


 


276-325       4 UNITS 276-325       7 UNITS 276-325       8 UNITS 276-325       

9 UNITS


 


 


326-375       5 UNITS 326-375       9 UNITS 326-375      10 UNITS 326-375      

11 UNITS


 


 


>375    CONTACT MD >375    CONTACT MD >375    CONTACT MD >375    CONTACT MD


 











Custom Dosing: 





   [Choose: None/Novolog/Humalog] 100 units/ml Insulin inject subq as follows:











   GB    Units


 


 [] Units


 


141-175 [] Units


 


176-225 [] Units


 


226-275 [] Units


 


276-325 []Units


 


326-375 [] Units


 


>375 Contact MD

















- Diet


Type: Geriatric


Texture: Regular


Liquids: Thin





- Therapies | Activity


Therapy: Evaluation | Treat if indicated: PT, OT


Rehabilitation Potential: Maximize functional status, Return to independent 

living


Activity: Activity as Tolerated


Assistance Devices: Walker





<Nany Nascimento L - Last Filed: 03/13/18 17:54>





- DC Plan and Transition Orders


Disposition: 03 [facility] DC/Xfer


Condition: Good


Allergies and Adverse Reactions: 


 Allergies











Allergy/AdvReac Type Severity Reaction Status Date / Time


 


No Known Drug Allergies Allergy   Verified 11/08/16 05:37














- Therapies | Activity


Additional Instructions: 


She was admitted with severe lethargy and altered mental status with 

hallucination. Found to have UTI, dehydration, prolonged INR. All corrected w 

IV antibiotics. CT head negative for subdural. Treat UTI until 3/16/18. Resume 

coumadin when INR 3 or less and check INR weekly. goal is to return to home 

with daughter.

## 2018-03-13 VITALS — SYSTOLIC BLOOD PRESSURE: 158 MMHG | DIASTOLIC BLOOD PRESSURE: 64 MMHG

## 2018-03-13 LAB
ALBUMIN DIAFP-MCNC: 3.3 G/DL (ref 3.2–5.5)
ALBUMIN/GLOB SERPL: 0.9 {RATIO} (ref 1–2.2)
ALP SERPL-CCNC: 47 IU/L (ref 42–121)
ALT SERPL W P-5'-P-CCNC: 14 IU/L (ref 10–60)
ANION GAP SERPL CALCULATED.4IONS-SCNC: 11 MMOL/L (ref 6–13)
AST SERPL W P-5'-P-CCNC: 21 IU/L (ref 10–42)
BASOPHILS NFR BLD AUTO: 0.1 10^3/UL (ref 0–0.1)
BASOPHILS NFR BLD AUTO: 1 %
BILIRUB BLD-MCNC: 0.4 MG/DL (ref 0.2–1)
BUN SERPL-MCNC: 15 MG/DL (ref 6–20)
CALCIUM UR-MCNC: 7.9 MG/DL (ref 8.5–10.3)
CHLORIDE SERPL-SCNC: 101 MMOL/L (ref 101–111)
CO2 SERPL-SCNC: 25 MMOL/L (ref 21–32)
CREAT SERPLBLD-SCNC: 0.8 MG/DL (ref 0.4–1)
EOSINOPHIL # BLD AUTO: 0.3 10^3/UL (ref 0–0.7)
EOSINOPHIL NFR BLD AUTO: 3.6 %
ERYTHROCYTE [DISTWIDTH] IN BLOOD BY AUTOMATED COUNT: 14 % (ref 12–15)
GFRSERPLBLD MDRD-ARVRAT: 68 ML/MIN/{1.73_M2} (ref 89–?)
GLOBULIN SER-MCNC: 3.5 G/DL (ref 2.1–4.2)
GLUCOSE SERPL-MCNC: 84 MG/DL (ref 70–100)
HGB UR QL STRIP: 9.1 G/DL (ref 12–16)
INR PPP: 3.1 (ref 0.8–1.2)
LYMPHOCYTES # SPEC AUTO: 1.1 10^3/UL (ref 1.5–3.5)
LYMPHOCYTES NFR BLD AUTO: 15.1 %
MCH RBC QN AUTO: 31.9 PG (ref 27–31)
MCHC RBC AUTO-ENTMCNC: 33.1 G/DL (ref 32–36)
MCV RBC AUTO: 96.4 FL (ref 81–99)
MONOCYTES # BLD AUTO: 0.5 10^3/UL (ref 0–1)
MONOCYTES NFR BLD AUTO: 6.6 %
NEUTROPHILS # BLD AUTO: 5.3 10^3/UL (ref 1.5–6.6)
NEUTROPHILS # SNV AUTO: 7.1 X10^3/UL (ref 4.8–10.8)
NEUTROPHILS NFR BLD AUTO: 73.7 %
PDW BLD AUTO: 7.1 FL (ref 7.9–10.8)
PLATELET # BLD: 221 10^3/UL (ref 130–450)
PROT SPEC-MCNC: 6.8 G/DL (ref 6.7–8.2)
PROTHROM ACT/NOR PPP: 33.7 SECS (ref 9.9–12.6)
RBC MAR: 2.85 10^6/UL (ref 4.2–5.4)
SODIUM SERPLBLD-SCNC: 137 MMOL/L (ref 135–145)

## 2018-03-13 RX ADMIN — PRAVASTATIN SODIUM SCH MG: 10 TABLET ORAL at 08:49

## 2018-03-13 RX ADMIN — DOCUSATE SODIUM SCH MG: 250 CAPSULE, LIQUID FILLED ORAL at 08:49

## 2018-03-13 RX ADMIN — SODIUM CHLORIDE, PRESERVATIVE FREE SCH ML: 5 INJECTION INTRAVENOUS at 08:49

## 2018-03-13 RX ADMIN — SODIUM CHLORIDE, PRESERVATIVE FREE SCH ML: 5 INJECTION INTRAVENOUS at 00:14

## 2018-03-13 RX ADMIN — LEVOTHYROXINE SODIUM SCH MCG: 0.03 TABLET ORAL at 06:25

## 2018-03-13 RX ADMIN — POLYETHYLENE GLYCOL 3350 SCH: 17 POWDER, FOR SOLUTION ORAL at 08:49

## 2018-03-13 RX ADMIN — SENNOSIDES SCH MG: 8.6 TABLET, FILM COATED ORAL at 08:49

## 2018-03-13 RX ADMIN — Medication SCH MG: at 08:49

## 2018-03-13 RX ADMIN — PANTOPRAZOLE SODIUM SCH MG: 40 TABLET, DELAYED RELEASE ORAL at 06:25

## 2018-03-13 NOTE — DISCHARGE SUMMARY
Discharge Summary


Admit Date: 03/09/18


Discharge Date: 03/13/18


Discharging Provider: ADRYAN Godfrey


Primary Care Provider: Brea Soni


Code Status: Attempt Resuscitation


Condition at Discharge: Good


Discharge Disposition: 03 SNF DC/Xfer


Discharge Facility Name: [facility]





- DIAGNOSES


Admission Diagnoses: 


Urinary tract infection, site not specified (N39.0) 


Encephalopathy, unspecified (G93.40) 


Acute kidney failure, unspecified (N17.9) 


Unspecified atrial fibrillation (I48.91) 


Hypothyroidism, unspecified (E03.9) 


Essential (primary) hypertension (I10) 


Hypo-osmolality and hyponatremia (E87.1) 


Hyperlipidemia, unspecified (E78.5) 


Hyperkalemia (E87.5)





Discharge Diagnoses with Status of Each Condition: 


UTI (urinary tract infection) (N39.0) improved, patient to take PO antibiotics 

until 3/16/18.


Encephalopathy (G93.40) resolved.


YONATAN (acute kidney injury) (N17.9) resolved.


Atrial fibrillation (I48.91) chronic, stable.


Hypothyroidism (E03.9) chronic, stable.


Hypertension (I10) chronic, stable.


Hyponatremia (E87.1) resolved.


Hyperlipidemia (E78.5) chronic, stable.


Hyperkalemia (E87.5) resolved.








- HPI


History of Present Illness: 


HPI per Dr. Mcneal: Patient is an 89-year-old female with past medical history 

significant for hypertension, hypothyroidism, COPD, vitamin D deficiency, 

history of stroke in 2016 with minimal residual deficits and atrial 

fibrillation on Coumadin who presented to the emergency department with chief 

complaint of generalized weakness and lethargy.  According to the patient's 

daughter the patient was in her normal state of health until yesterday when she 

seem to be sluggish.  She was less active than normal and not speaking as much.

  The patient's daughter states that today the patient had decreased appetite 

and did not eat much of her breakfast or lunch.  She states that she was less 

alert and was lethargic this evening.  She states that the patient was walking 

with her walker when her knees began to buckle and the patient slowly fell to 

the floor.  The patient was then unable to get up and appeared to be confused.  

When asked the patient does state that she has been having dysuria over the 

last day and does feel nauseated.  The patient denies any fevers or chills.  

The patient denies any abdominal pain or flank pain.





The patient denies any headaches, blurred vision, runny nose, sore throat, 

nasal congestion, difficulty swallowing, chest pain, shortness of air, orthopnea

, PND, increased lower extremity swelling, vomiting, constipation, diarrhea, 

joint swelling, joint pain, muscle aches, back pain, neck stiffness, recent 

unintentional weight loss or any focal neurologic deficits.





On presentation to the emergency department the patient was afebrile and her 

vital signs were stable.  The patient underwent routine lab work which did 

reveal a leukocytosis of 11.8 and a hyponatremia with sodium of 130.  The 

patient also appear to have acute kidney injury as her creatinine was up to 1.9 

from a baseline of 0.8-1.1.  The patient did have a slightly elevated INR of 

5.3 and an elevated potassium of 5.5.  The patient did suffer a sprained left 

ankle due to the fall and x-ray of the left ankle showed no evidence of 

fracture.  The patient had fallen on her left knee and a x-ray of the left knee 

was also normal.  The patient underwent a chest x-ray to look for possible 

source of infection given her lethargy, generalized weakness and leukocytosis 

but chest x-ray showed no acute pulmonary abnormality.  The patient's urine 

analysis did reveal large leukocyte esterase with greater than 25 WBCs and many 

bacteria consistent with a urinary tract infection.  The patient was admitted 

to the medical bah with a UTI, acute kidney injury and generalized weakness.








- HOSPITAL COURSE


Hospital Course: 


The following diagnoses were prevalent for this hospital stay: 





(1) UTI (urinary tract infection)-  Patient presented with generalized weakness 

and confusion.  Patient had a fall at home and then was unable to get up and 

was increasingly lethargic and confused.  She also had poor appetite throughout 

the day.  Patient complained of dysuria and nausea when she was in the 

emergency department.  Patient did have a mild leukocytosis, acute kidney 

injury and appeared dehydrated.  The patient's urine analysis revealed positive 

leukocyte esterase with greater than 25 WBCs and bacteria consistent with a 

urinary tract infection.  Given the systemic nature of the urinary tract 

infection and the patient's age the patient was admitted to the medical bah 

for treatment for UTI with IV antibiotics and IV fluids. Culture is  positive 

for Citrobacter koseri. Sensitive to rocephin and resistant to piperacillin.  

Patient completed 5 days of IV antibiotics with ceftriaxone, and transitioned 

to Keflex upon discharge to [facility] until 3/16/18.





(2) Encephalopathy-  Patient had lethargy and confusion on presentation to the 

emergency department and into the night of admisson and the next day.  It 

appears that the encephalopathy is likely metabolic secondary to patient's 

urinary tract infection and dehydration with acute kidney injury.  Underlying 

cause was treated, which appears to be infection and dehydration with IV 

antibiotics and IV fluids.  A Noncontrast CT of head was without subdural and 

completed on 3/11/18.  She continued to improve during this stay.  She 

continues to work with PT for weakness, who recommends SNF.





(3) YONATAN (acute kidney injury)-  Patient has a baseline creatinine of 0.8-1.1 On 

presentation the patient's creatinine is elevated at 1.9.  Patient also has an 

elevated BUN of 26 and her labs as well as her physical exam are consistent 

with dehydration.  Patient appears likely to have dehydration secondary to 

urinary tract infection and poor appetite throughout the day today. She was 1.2 

originally and by 3/11 was 0.8.  Nephrotoxic agents were avoided, including 

lasix and labs were monitored.  YONATAN is considered resolved at the time of 

discharge.





(4) Atrial fibrillation-  The patient has a history of atrial fibrillation and 

did have an embolic stroke in 2016.  She is currently on Coumadin at home and 

diltiazem for rate control.  Her initial EKG did not show atrial fibrillation.  

Patient's INR on presentation was 5.3 at the time of admission, so Coumadin was 

held.  Final INR was 3.1.  Coumadin continues to be on hold.  Patient will be 

continued on her home dose of diltiazem.





(5) Hypothyroidism-  The patient has history of hypothyroidism and is on 

Synthroid at home.  Currently she appears to be stable from the standpoint of 

hypothyroidism.  We will continue her home Synthroid and TSH was 1.7. NO change 

in meds.





(6) Hypertension-  Patient has history of hypertension and is on 

antihypertensives at home on presentation to the emergency department the 

patient's blood pressure was elevated however on presentation to the medical 

bah the patient's blood pressure is borderline at 101/39.  We will cautiously 

continue the patient's home antihypertensive medications but we will keep a 

close eye on her blood pressure as if it does drop we will need to stop the 

antihypertensives and give fluid resuscitation in the setting of an infection. 

Systolic has been 151-153 since last night. 





(7) Hyponatremia-  The patient has hyponatremia on presentation with a sodium 

of 130.  Patient does appear to be dehydrated and this is likely hypovolemic 

hyponatremia.


Patient will be given IV fluids and will continue to monitor her sodium. 

Improved to 137 on 3/13.





(8) Hyperlipidemia-  Patient has history of hyperlipidemia and is on a statin 

at home we will continue her on statin while she is hospitalized and to be 

continued at SNF.





(9) Hyperkalemia-  This is likely secondary to acute kidney injury.  EKG does 

not show any evidence of this.  Patient was treated for the underlying cause 

which appears to be acute kidney injury with IV fluids.  Patient's potassium 

was monitored.  She was 5.5 on admission and is 3.3 at the time of discharge.





Disposition:  Patient was transported to [facility] under the care of 

Dr. Padilla.  She was noted to be in stable condition at the time of discharge.





- ALLERGIES


Allergies/Adverse Reactions: 


 Allergies











Allergy/AdvReac Type Severity Reaction Status Date / Time


 


No Known Drug Allergies Allergy   Verified 11/08/16 05:37














- MEDICATIONS


Home Medications: 


 Ambulatory Orders











 Medication  Instructions  Recorded  Confirmed


 


Diltiazem HCl [Diltiazem 24Hr ER] 240 mg PO DAILY 08/14/14 03/10/18


 


Levalbuterol [Xopenex] 1.25 mg INH RTQ6H PRN #0 neb 11/09/16 03/10/18


 


Acetaminophen [Tylenol] 650 mg PO Q4HR PRN  tablet 03/12/18 


 


Atorvastatin Calcium 10 mg PO QPM #0 03/12/18 03/10/18


 


Calcium Carbonate [Tums (Calcium 500 mg PO DAILY #0 tablet 03/12/18 03/10/18





Carbonate 500mg)]   


 


Furosemide [Lasix] 20 mg PO DAILY #0 03/12/18 03/10/18


 


Levothyroxine [Synthroid] 25 mcg PO QPM #0 03/12/18 03/10/18


 


Multivitamin [Theragran] 1 tab PO DAILYWM #0 tablet 03/12/18 03/10/18


 


Potassium Chloride 10 meq PO DAILY #0 03/12/18 03/10/18


 


Warfarin [Coumadin] 1.5 mg PO DAILY #0 03/12/18 03/10/18


 


cephALEXin [Keflex] 250 mg PO Q6HR  capsule 03/12/18 














- PHYSICAL EXAM AT DISCHARGE


General Appearance: positive: No acute distress, Alert


Eyes Bilateral: positive: PERRL


ENT: positive: ENT inspection nml, No signs of dehydration


Neck: positive: Nml inspection, Thyroid nml


Respiratory: positive: Chest non-tender, No respiratory distress, Other (

diminished)


Cardiovascular: positive: Regular rate & rhythm


Peripheral Pulses: positive: 1+


Abdomen: positive: Non-tender, Nml bowel sounds


Back: positive: Nml inspection


Skin: positive: No rash, Warm, Dry


Extremities: positive: Non-tender, Full ROM, No pedal edema


Neurologic/Psychiatric: positive: Disoriented to place, Disoriented to time, 

Weakness, Sensory loss, Depressed mood/affect


Reflexes: Bicep (R): 2+, Bicep (L): 2+





- LABS


Result Diagrams: 


 03/13/18 05:01





 03/13/18 05:01





- DIAGNOSTIC IMAGING


Diagnostic Imaging Results: Final report reviewed


Diagnostic Imaging Results Comments: 


EXAM: LEFT KNEE RADIOGRAPHY 





EXAM DATE: 3/9/2018 07:08 PM. 





CLINICAL HISTORY: Fall. Left knee pain. 





COMPARISON: None. 





TECHNIQUE: 4 views. 





FINDINGS: Bones: Normal. No fractures or bone lesions. 





Joints: Normal. No effusion. No subluxations. 





Soft Tissues: Mild atherosclerotic arterial calcification noted. 





IMPRESSION: Normal knee radiography. 








EXAM: LEFT ANKLE RADIOGRAPHY 





EXAM DATE: 3/9/2018 07:08 PM. 





CLINICAL HISTORY: Fall. Left ankle pain. 





COMPARISON: None. 





TECHNIQUE: 3 views. 





FINDINGS: Bones: No traumatic or destructive bone abnormalities. Calcaneal 

enthesophytes noted. 





Joints: Normal. No effusion. No subluxations. The ankle mortise is normally 

aligned. 





Soft Tissues: Unremarkable. 





IMPRESSION: No acute bony abnormalities. Calcaneal enthesophytes noted. 








EXAM: CHEST RADIOGRAPHY 





EXAM DATE: 3/9/2018 07:07 PM. 





CLINICAL HISTORY: Chest pain. Generalized Weakness. 





COMPARISON: 11/08/2016. 





TECHNIQUE: 2 views. 





FINDINGS: Lungs/Pleura: No focal opacities evident. No pleural effusion. No 

pneumothorax. Normal volumes. 





Mediastinum: Large heart. 





Other: No compression fractures. 





IMPRESSION: Cardiomegaly without CHF or acute pulmonary abnormality. 








EXAM: CT HEAD 





EXAM DATE: 3/11/2018 10:55 PM. 





CLINICAL HISTORY: Altered mental status after a fall. 





COMPARISON: Brain CT from 11/14/2016. 





TECHNIQUE: Multiaxial CT images were obtained from the foramen magnum to the 

vertex. Reformats: Coronal. IV contrast: None. 





In accordance with CT protocol optimization, one or more of the following dose 

reduction techniques were utilized for this exam: automated exposure control, 

adjustment of mA and/or KV based on patient size, or use of iterative 

reconstructive technique. 





FINDINGS: Parenchyma: No intraparenchymal hemorrhage. No evidence of mass, 

midline shift, or CT findings of acute infarction. Encephalomalacia in the 

superior left cerebellum is stable. Gray-white differentiation is distinct. 

Moderate diffuse chronic microangiopathic white matter changes are evident. 





Extraaxial Spaces: No acute subdural or epidural collections identified. 





Ventricles: The ventricles and cortical sulci are moderately enlarged, 

consistent with age-related tissue loss. 





Sinuses and Orbits: Imaged paranasal sinuses, orbits, and mastoids show no 

significant abnormality. 





Bones: No evidence of fracture or calvarial defect. 





Other: Extensive intracranial atherosclerosis is noted. 





IMPRESSION: Generalized age-related cortical atrophic changes without evidence 

of acute intracranial abnormality. 














- FOLLOW UP


Follow Up: 


SNF Transition





Admit to: [facility] under the care of Dr. Padilla.





- TIME SPENT


Time Spent in Discharge (Minutes): 45

## 2018-03-17 ENCOUNTER — HOSPITAL ENCOUNTER (OUTPATIENT)
Dept: HOSPITAL 76 - LAB.R | Age: 83
Discharge: HOME | End: 2018-03-17
Attending: SKILLED NURSING FACILITY
Payer: MEDICARE

## 2018-03-17 DIAGNOSIS — I50.9: ICD-10-CM

## 2018-03-17 DIAGNOSIS — I12.9: Primary | ICD-10-CM

## 2018-03-17 DIAGNOSIS — D50.9: ICD-10-CM

## 2018-03-17 DIAGNOSIS — E03.9: ICD-10-CM

## 2018-03-17 DIAGNOSIS — E78.5: ICD-10-CM

## 2018-03-17 LAB
ALBUMIN DIAFP-MCNC: 3.8 G/DL (ref 3.2–5.5)
ALBUMIN/GLOB SERPL: 1.1 {RATIO} (ref 1–2.2)
ALP SERPL-CCNC: 51 IU/L (ref 42–121)
ALT SERPL W P-5'-P-CCNC: 16 IU/L (ref 10–60)
ANION GAP SERPL CALCULATED.4IONS-SCNC: 10 MMOL/L (ref 6–13)
AST SERPL W P-5'-P-CCNC: 24 IU/L (ref 10–42)
BASOPHILS NFR BLD AUTO: 0 10^3/UL (ref 0–0.1)
BASOPHILS NFR BLD AUTO: 0.6 %
BILIRUB BLD-MCNC: 0.5 MG/DL (ref 0.2–1)
BUN SERPL-MCNC: 19 MG/DL (ref 6–20)
CALCIUM UR-MCNC: 8.8 MG/DL (ref 8.5–10.3)
CHLORIDE SERPL-SCNC: 96 MMOL/L (ref 101–111)
CHOLEST SERPL-MCNC: 173 MG/DL
CO2 SERPL-SCNC: 27 MMOL/L (ref 21–32)
CREAT SERPLBLD-SCNC: 1.1 MG/DL (ref 0.4–1)
EOSINOPHIL # BLD AUTO: 0.1 10^3/UL (ref 0–0.7)
EOSINOPHIL NFR BLD AUTO: 2.9 %
ERYTHROCYTE [DISTWIDTH] IN BLOOD BY AUTOMATED COUNT: 14 % (ref 12–15)
GFRSERPLBLD MDRD-ARVRAT: 47 ML/MIN/{1.73_M2} (ref 89–?)
GLOBULIN SER-MCNC: 3.6 G/DL (ref 2.1–4.2)
GLUCOSE SERPL-MCNC: 103 MG/DL (ref 70–100)
HDLC SERPL-MCNC: 69 MG/DL
HDLC SERPL: 2.5 {RATIO} (ref ?–4.4)
HGB UR QL STRIP: 10.9 G/DL (ref 12–16)
LDLC SERPL CALC-MCNC: 89 MG/DL
LDLC/HDLC SERPL: 1.3 {RATIO} (ref ?–4.4)
LYMPHOCYTES # SPEC AUTO: 0.6 10^3/UL (ref 1.5–3.5)
LYMPHOCYTES NFR BLD AUTO: 13.4 %
MCH RBC QN AUTO: 32.8 PG (ref 27–31)
MCHC RBC AUTO-ENTMCNC: 33.9 G/DL (ref 32–36)
MCV RBC AUTO: 96.9 FL (ref 81–99)
MONOCYTES # BLD AUTO: 0.6 10^3/UL (ref 0–1)
MONOCYTES NFR BLD AUTO: 12.6 %
NEUTROPHILS # BLD AUTO: 3.4 10^3/UL (ref 1.5–6.6)
NEUTROPHILS # SNV AUTO: 4.8 X10^3/UL (ref 4.8–10.8)
NEUTROPHILS NFR BLD AUTO: 70.5 %
PDW BLD AUTO: 7.2 FL (ref 7.9–10.8)
PLATELET # BLD: 272 10^3/UL (ref 130–450)
PROT SPEC-MCNC: 7.4 G/DL (ref 6.7–8.2)
RBC MAR: 3.33 10^6/UL (ref 4.2–5.4)
SODIUM SERPLBLD-SCNC: 133 MMOL/L (ref 135–145)
VLDLC SERPL-SCNC: 15 MG/DL

## 2018-03-17 PROCEDURE — 83721 ASSAY OF BLOOD LIPOPROTEIN: CPT

## 2018-03-17 PROCEDURE — 80053 COMPREHEN METABOLIC PANEL: CPT

## 2018-03-17 PROCEDURE — 80061 LIPID PANEL: CPT

## 2018-03-17 PROCEDURE — 84443 ASSAY THYROID STIM HORMONE: CPT

## 2018-03-17 PROCEDURE — 85025 COMPLETE CBC W/AUTO DIFF WBC: CPT

## 2018-03-17 PROCEDURE — 83880 ASSAY OF NATRIURETIC PEPTIDE: CPT

## 2018-04-14 ENCOUNTER — HOSPITAL ENCOUNTER (OUTPATIENT)
Dept: HOSPITAL 76 - LAB.R | Age: 83
Discharge: HOME | End: 2018-04-14
Attending: SKILLED NURSING FACILITY
Payer: COMMERCIAL

## 2018-04-14 DIAGNOSIS — E87.1: ICD-10-CM

## 2018-04-14 DIAGNOSIS — E61.2: ICD-10-CM

## 2018-04-14 DIAGNOSIS — I50.9: ICD-10-CM

## 2018-04-14 DIAGNOSIS — I12.9: Primary | ICD-10-CM

## 2018-04-14 LAB
ANION GAP SERPL CALCULATED.4IONS-SCNC: 10 MMOL/L (ref 6–13)
BASOPHILS NFR BLD AUTO: 0.1 10^3/UL (ref 0–0.1)
BASOPHILS NFR BLD AUTO: 0.9 %
BUN SERPL-MCNC: 18 MG/DL (ref 6–20)
CALCIUM UR-MCNC: 8.4 MG/DL (ref 8.5–10.3)
CHLORIDE SERPL-SCNC: 99 MMOL/L (ref 101–111)
CO2 SERPL-SCNC: 26 MMOL/L (ref 21–32)
CREAT SERPLBLD-SCNC: 1.1 MG/DL (ref 0.4–1)
EOSINOPHIL # BLD AUTO: 0.2 10^3/UL (ref 0–0.7)
EOSINOPHIL NFR BLD AUTO: 4 %
ERYTHROCYTE [DISTWIDTH] IN BLOOD BY AUTOMATED COUNT: 14.9 % (ref 12–15)
GFRSERPLBLD MDRD-ARVRAT: 47 ML/MIN/{1.73_M2} (ref 89–?)
GLUCOSE SERPL-MCNC: 93 MG/DL (ref 70–100)
HGB UR QL STRIP: 10.3 G/DL (ref 12–16)
LYMPHOCYTES # SPEC AUTO: 1.2 10^3/UL (ref 1.5–3.5)
LYMPHOCYTES NFR BLD AUTO: 20.4 %
MAGNESIUM SERPL-MCNC: 2.4 MG/DL (ref 1.7–2.8)
MCH RBC QN AUTO: 31.5 PG (ref 27–31)
MCHC RBC AUTO-ENTMCNC: 32.2 G/DL (ref 32–36)
MCV RBC AUTO: 97.9 FL (ref 81–99)
MONOCYTES # BLD AUTO: 0.5 10^3/UL (ref 0–1)
MONOCYTES NFR BLD AUTO: 9.3 %
NEUTROPHILS # BLD AUTO: 3.8 10^3/UL (ref 1.5–6.6)
NEUTROPHILS # SNV AUTO: 5.9 X10^3/UL (ref 4.8–10.8)
NEUTROPHILS NFR BLD AUTO: 65.4 %
PDW BLD AUTO: 7.7 FL (ref 7.9–10.8)
PLATELET # BLD: 276 10^3/UL (ref 130–450)
RBC MAR: 3.28 10^6/UL (ref 4.2–5.4)
SODIUM SERPLBLD-SCNC: 135 MMOL/L (ref 135–145)

## 2018-04-14 PROCEDURE — 83735 ASSAY OF MAGNESIUM: CPT

## 2018-04-14 PROCEDURE — 83880 ASSAY OF NATRIURETIC PEPTIDE: CPT

## 2018-04-14 PROCEDURE — 80048 BASIC METABOLIC PNL TOTAL CA: CPT

## 2018-04-14 PROCEDURE — 85025 COMPLETE CBC W/AUTO DIFF WBC: CPT

## 2018-05-17 ENCOUNTER — HOSPITAL ENCOUNTER (OUTPATIENT)
Dept: HOSPITAL 76 - ED | Age: 83
Setting detail: OBSERVATION
LOS: 1 days | Discharge: NURSING, REHAB OR LONG-TERM CARE | End: 2018-05-18
Attending: NURSE PRACTITIONER | Admitting: NURSE PRACTITIONER
Payer: MEDICARE

## 2018-05-17 ENCOUNTER — HOSPITAL ENCOUNTER (OUTPATIENT)
Dept: HOSPITAL 76 - EMS | Age: 83
Discharge: ANOTHER HOSPITAL | End: 2018-05-17
Attending: SURGERY
Payer: MEDICARE

## 2018-05-17 DIAGNOSIS — R10.2: Primary | ICD-10-CM

## 2018-05-17 DIAGNOSIS — N81.4: ICD-10-CM
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
## 2018-05-17 DIAGNOSIS — K76.89: ICD-10-CM

## 2018-05-17 DIAGNOSIS — I11.0: ICD-10-CM

## 2018-05-17 DIAGNOSIS — Z79.01: ICD-10-CM

## 2018-05-17 DIAGNOSIS — Z91.81: ICD-10-CM

## 2018-05-17 DIAGNOSIS — R40.2412: ICD-10-CM

## 2018-05-17 DIAGNOSIS — Z74.01: ICD-10-CM

## 2018-05-17 DIAGNOSIS — E03.9: ICD-10-CM

## 2018-05-17 DIAGNOSIS — K21.9: ICD-10-CM

## 2018-05-17 DIAGNOSIS — I48.2: ICD-10-CM

## 2018-05-17 DIAGNOSIS — M51.36: ICD-10-CM
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
## 2018-05-17 DIAGNOSIS — Z79.899: ICD-10-CM

## 2018-05-17 DIAGNOSIS — S32.10XA: Primary | ICD-10-CM

## 2018-05-17 DIAGNOSIS — I69.30: ICD-10-CM

## 2018-05-17 DIAGNOSIS — Z87.440: ICD-10-CM

## 2018-05-17 DIAGNOSIS — I50.9: ICD-10-CM

## 2018-05-17 LAB
ALBUMIN DIAFP-MCNC: 3.7 G/DL (ref 3.2–5.5)
ALBUMIN/GLOB SERPL: 0.9 {RATIO} (ref 1–2.2)
ALP SERPL-CCNC: 66 IU/L (ref 42–121)
ALT SERPL W P-5'-P-CCNC: 15 IU/L (ref 10–60)
ANION GAP SERPL CALCULATED.4IONS-SCNC: 10 MMOL/L (ref 6–13)
AST SERPL W P-5'-P-CCNC: 20 IU/L (ref 10–42)
BASOPHILS NFR BLD AUTO: 0 10^3/UL (ref 0–0.1)
BASOPHILS NFR BLD AUTO: 0.6 %
BILIRUB BLD-MCNC: 1.1 MG/DL (ref 0.2–1)
BUN SERPL-MCNC: 13 MG/DL (ref 6–20)
CALCIUM UR-MCNC: 8.2 MG/DL (ref 8.5–10.3)
CHLORIDE SERPL-SCNC: 93 MMOL/L (ref 101–111)
CLARITY UR REFRACT.AUTO: CLEAR
CO2 SERPL-SCNC: 27 MMOL/L (ref 21–32)
CREAT SERPLBLD-SCNC: 0.8 MG/DL (ref 0.4–1)
EOSINOPHIL # BLD AUTO: 0.2 10^3/UL (ref 0–0.7)
EOSINOPHIL NFR BLD AUTO: 2.4 %
ERYTHROCYTE [DISTWIDTH] IN BLOOD BY AUTOMATED COUNT: 14.6 % (ref 12–15)
GFRSERPLBLD MDRD-ARVRAT: 68 ML/MIN/{1.73_M2} (ref 89–?)
GLOBULIN SER-MCNC: 3.9 G/DL (ref 2.1–4.2)
GLUCOSE SERPL-MCNC: 100 MG/DL (ref 70–100)
GLUCOSE UR QL STRIP.AUTO: NEGATIVE MG/DL
HGB UR QL STRIP: 11.7 G/DL (ref 12–16)
INR PPP: 2.5 (ref 0.8–1.2)
KETONES UR QL STRIP.AUTO: NEGATIVE MG/DL
LIPASE SERPL-CCNC: 19 U/L (ref 22–51)
LYMPHOCYTES # SPEC AUTO: 0.8 10^3/UL (ref 1.5–3.5)
LYMPHOCYTES NFR BLD AUTO: 11.3 %
MCH RBC QN AUTO: 32.1 PG (ref 27–31)
MCHC RBC AUTO-ENTMCNC: 33.6 G/DL (ref 32–36)
MCV RBC AUTO: 95.4 FL (ref 81–99)
MONOCYTES # BLD AUTO: 0.4 10^3/UL (ref 0–1)
MONOCYTES NFR BLD AUTO: 5.7 %
NEUTROPHILS # BLD AUTO: 5.5 10^3/UL (ref 1.5–6.6)
NEUTROPHILS # SNV AUTO: 6.8 X10^3/UL (ref 4.8–10.8)
NEUTROPHILS NFR BLD AUTO: 80 %
NITRITE UR QL STRIP.AUTO: NEGATIVE
PDW BLD AUTO: 7.2 FL (ref 7.9–10.8)
PH UR STRIP.AUTO: 5.5 PH (ref 5–7.5)
PLATELET # BLD: 250 10^3/UL (ref 130–450)
PROT SPEC-MCNC: 7.6 G/DL (ref 6.7–8.2)
PROT UR STRIP.AUTO-MCNC: NEGATIVE MG/DL
PROTHROM ACT/NOR PPP: 27.1 SECS (ref 9.9–12.6)
RBC # UR STRIP.AUTO: (no result) /UL
RBC # URNS HPF: (no result) /HPF (ref 0–5)
RBC MAR: 3.64 10^6/UL (ref 4.2–5.4)
SODIUM SERPLBLD-SCNC: 130 MMOL/L (ref 135–145)
SP GR UR STRIP.AUTO: <=1.005 (ref 1–1.03)
SQUAMOUS URNS QL MICRO: (no result)
UROBILINOGEN UR QL STRIP.AUTO: (no result) E.U./DL
UROBILINOGEN UR STRIP.AUTO-MCNC: NEGATIVE MG/DL

## 2018-05-17 PROCEDURE — 81003 URINALYSIS AUTO W/O SCOPE: CPT

## 2018-05-17 PROCEDURE — 83690 ASSAY OF LIPASE: CPT

## 2018-05-17 PROCEDURE — 71045 X-RAY EXAM CHEST 1 VIEW: CPT

## 2018-05-17 PROCEDURE — 85610 PROTHROMBIN TIME: CPT

## 2018-05-17 PROCEDURE — 74176 CT ABD & PELVIS W/O CONTRAST: CPT

## 2018-05-17 PROCEDURE — 83605 ASSAY OF LACTIC ACID: CPT

## 2018-05-17 PROCEDURE — 96361 HYDRATE IV INFUSION ADD-ON: CPT

## 2018-05-17 PROCEDURE — 93005 ELECTROCARDIOGRAM TRACING: CPT

## 2018-05-17 PROCEDURE — 36415 COLL VENOUS BLD VENIPUNCTURE: CPT

## 2018-05-17 PROCEDURE — 96376 TX/PRO/DX INJ SAME DRUG ADON: CPT

## 2018-05-17 PROCEDURE — 51701 INSERT BLADDER CATHETER: CPT

## 2018-05-17 PROCEDURE — 72131 CT LUMBAR SPINE W/O DYE: CPT

## 2018-05-17 PROCEDURE — 87086 URINE CULTURE/COLONY COUNT: CPT

## 2018-05-17 PROCEDURE — 96365 THER/PROPH/DIAG IV INF INIT: CPT

## 2018-05-17 PROCEDURE — 83735 ASSAY OF MAGNESIUM: CPT

## 2018-05-17 PROCEDURE — 85025 COMPLETE CBC W/AUTO DIFF WBC: CPT

## 2018-05-17 PROCEDURE — 72148 MRI LUMBAR SPINE W/O DYE: CPT

## 2018-05-17 PROCEDURE — 76705 ECHO EXAM OF ABDOMEN: CPT

## 2018-05-17 PROCEDURE — 80053 COMPREHEN METABOLIC PANEL: CPT

## 2018-05-17 PROCEDURE — 99284 EMERGENCY DEPT VISIT MOD MDM: CPT

## 2018-05-17 PROCEDURE — 84443 ASSAY THYROID STIM HORMONE: CPT

## 2018-05-17 PROCEDURE — 99285 EMERGENCY DEPT VISIT HI MDM: CPT

## 2018-05-17 PROCEDURE — 81001 URINALYSIS AUTO W/SCOPE: CPT

## 2018-05-17 PROCEDURE — 72100 X-RAY EXAM L-S SPINE 2/3 VWS: CPT

## 2018-05-17 PROCEDURE — 96367 TX/PROPH/DG ADDL SEQ IV INF: CPT

## 2018-05-17 PROCEDURE — 96375 TX/PRO/DX INJ NEW DRUG ADDON: CPT

## 2018-05-17 PROCEDURE — 36556 INSERT NON-TUNNEL CV CATH: CPT

## 2018-05-17 RX ADMIN — SODIUM CHLORIDE SCH MLS/HR: 9 INJECTION, SOLUTION INTRAVENOUS at 20:30

## 2018-05-17 RX ADMIN — KETOROLAC TROMETHAMINE PRN MG: 15 INJECTION, SOLUTION INTRAMUSCULAR; INTRAVENOUS at 20:38

## 2018-05-17 NOTE — CT REPORT
EXAM:

CT ABDOMEN AND PELVIS

 

EXAM DATE: 5/17/2018 06:37 PM.

 

CLINICAL HISTORY: Abdominal and back pain.

 

COMPARISONS: None.

 

TECHNIQUE: Routine helical CT imaging was performed through the abdomen and pelvis. IV contrast: None
. Enteric contrast: No. Reconstructions: Coronal and sagittal.

 

In accordance with CT protocol optimization, one or more of the following dose reduction techniques w
ere utilized for this exam: automated exposure control, adjustment of mA and/or KV based on patient s
ize, or use of iterative reconstructive technique.

 

FINDINGS: 

Lung Bases: Mild cardiomegaly.

 

Liver: Several small hypodensities.

 

Gallbladder/Bile Ducts: Possible gallstones within the small caliber gallbladder. No biliary duct dil
atation.

 

Spleen: Normal.

 

Pancreas: Normal.

 

Adrenal Glands: Normal.

 

Kidneys: Mild right renal atrophy. No renal masses, stones nor hydronephrosis.

 

Peritoneal Cavity/Bowel: Diverticula off the colon. No free fluid, free air or adenopathy. No masses 
or acute inflammatory process. 

Normal appendix.

 

Pelvic Organs: Normal. The bladder and visualized pelvic organs are within normal limits.

 

Vasculature: No aneurysms or other significant abnormality.

 

Bones: No significant abnormality.

 

Other: None.

 

IMPRESSION: 

1. Mild cardiomegaly. 

2. Equivocal cholelithiasis. 

3. Mild right renal atrophy. 

4. Colonic diverticulosis. 

5. No radiographic explanation for this lady's presenting symptoms.

 

[facility]

Referring Provider Line: [phone]

 

SITE ID: [id number]

## 2018-05-17 NOTE — XRAY REPORT
EXAM: 

CHEST RADIOGRAPHY

 

EXAM DATE: 5/17/2018 04:19 PM.

 

CLINICAL HISTORY: Wheezing.

 

COMPARISON: 03/09/2018.

 

TECHNIQUE: 1 view. The patient is minimally rotated toward the left.

 

FINDINGS:

Lungs/Pleura: No focal opacities evident. No pleural effusion. No pneumothorax.

 

Mediastinum: Mild cardiomegaly, as before. Mitral annulus calcification. Mild aortic arch calcificati
on.

 

Other: None.

 

IMPRESSION: 

1. Lungs clear and normal in volume. 

2. Mild cardiomegaly, as before.

 

[facility]

Referring Provider Line: [phone]

 

SITE ID: [id number]

## 2018-05-17 NOTE — CT REPORT
EXAM:

CT LUMBAR SPINE WITHOUT CONTRAST

 

EXAM DATE: 5/17/2018 04:17 PM.

 

CLINICAL HISTORY: Back pain. Fall one week ago.

 

COMPARISONS: Radiograph 1119 hrs. Today.

 

TECHNIQUE: Thin-section axial images were acquired of the lumbar spine from T11 to S3 without contras
t. Post-processing: Coronal and sagittal reformats. Other: None.

 

In accordance with CT protocol optimization, one or more of the following dose reduction techniques w
ere utilized for this exam: automated exposure control, adjustment of mA and/or KV based on patient s
ize, or use of iterative reconstructive technique.

 

FINDINGS: 

Alignment: No scoliosis or spondylolisthesis.

 

Bones: Five non-rib-bearing lumbar vertebral bodies are present. Chronic-appearing mild central-super
ior endplate concave deformity at L2. Mild central-appearing concave deformity at L3 which appears ch
ronic. No acute fracture is identified. No discrete lytic or sclerotic lesions.

 

Disk Levels/Facets: Moderate degenerative disk disease at L4-L5. Mild flowing osteophytes which anter
iorly in the lower thoracic and lumbar spine consistent with diffuse idiopathic skeletal hyperostosis


 

Musculature: Mild generalized fatty atrophy.

 

Other: The visualized retroperitoneum is unremarkable. Small calcified gallstones. Moderate aortoilia
c atherosclerotic calcification.

 

IMPRESSION: 

1. Chronic-appearing mild superior L2 and inferior L3 central endplate compression deformities. 

2. No acute fracture is identified. 

3. Diffuse osteopenia. 

4. Moderate degenerative disk disease at L4-L5.

 

[facility]

Referring Provider Line: [phone]

 

SITE ID: [id number]

## 2018-05-17 NOTE — HISTORY & PHYSICAL EXAMINATION
Chief Complaint





- Chief Complaint


Chief Complaint: lower back pain





History of Present Illness





- Admitted From


Admitted From:: ER





- History Obtained From


History obtained from: her daughter





- History of Present Illness


HPI Comment/Other: 


Patient is an 89-year-old female with past medical history significant for 

hypertension, hypothyroidism, COPD, vitamin D deficiency, history of stroke in 

2016 with minimal residual deficits and atrial fibrillation on Coumadin, UTI, 

and encephalopathy, who presented to the emergency department with chief 

complaint of lower back pain. Pt was recently discharged from [facility] for her 

stroke rehab. Per pt's daughter, pt had two fall recently. The least one was  when Pt fell from the chair. Since that, pt continue to complaint of 

lower back pain. Pt did not have other injuries or pain from the falls. Pt 

appears lower back position pain when Pt was removed from one position to 

another position.Pt was reported to have urinary incontinence with foul-

smelling urine. She had complaining of dysuria yesterday. Pt denies chest pain, 

fever, chill, cough, shortness of breath. Urinalysis reveals no evidence of a 

urinary tract infection.  CT scan and Xray of her lumbar spine reveals 

degenerative joint disease and old compression fractures, without acute 

fracture.  the etiology of which causes pt's pain is undetermined at this time. 

MRI may be a useful diagnostic consideration for pt. Pt's daughter declines any 

opiates for pt to control her pain. Pt is admitted in Observation unit for 

further evaluation and treatment.








History





- Past Medical History


Cardiovascular: reports: Congestive heart failure, Atrial fibrillation


Respiratory: reports: Asthma


Endocrine/Autoimmune: reports: None, HyPOthyroidism


GI: reports: GERD


: reports: Incontinence


HEENT: reports: None


Psych: reports: None


Musculoskeletal: reports: None, Fatigue


Derm: reports: None


MRSA Hx?: No





- Past Surgical History


Neuro: reports: Radical neck





- Family & Social History


Family History: Mother:  (Dad was  from age 94 without medical 

history known), CAD, Father: 


Family History Comment/Other: pt has been living with her family in [facility]. Her  was  from [place]. Pt had one daughter as caregiver to her.


Living arrangement: At home


Living Situation: With family


Social History Notes: pt denies cigarette smoking, alcohol and drug abuse.





- Substance History


Use: Uses substance without health or social issues: NONE


Abuse: Recurrent use of substance despite neg consequences: NONE





- POLST


Patient has POLST: Yes


POLST Status: Full Code





Meds/Allgy





- Home Medications


Home Medications: 


 Ambulatory Orders











 Medication  Instructions  Recorded  Confirmed


 


Diltiazem HCl [Diltiazem 24Hr ER] 240 mg PO DAILY 14


 


Acetaminophen [Tylenol] 650 mg PO Q4HR PRN  tablet 18


 


Calcium Carbonate [Tums (Calcium 500 mg PO DAILY #0 tablet 18





Carbonate 500mg)]   


 


Furosemide [Lasix] 20 mg PO DAILY #0 18


 


Levothyroxine [Synthroid] 25 mcg PO QPM #0 18


 


Multivitamin [Theragran] 1 tab PO DAILYWM #0 tablet 18


 


Potassium Chloride 10 meq PO DAILY #0 18


 


Warfarin [Coumadin] 1.5 mg PO DAILY #0 18


 


Pravastatin Sodium [Pravastatin 10 mg PO DAILY 18





Sodium]   














- Allergies


Allergies/Adverse Reactions: 


 Allergies











Allergy/AdvReac Type Severity Reaction Status Date / Time


 


No Known Drug Allergies Allergy   Verified 16 05:37














Review of Systems





- Constitutional


Constitutional: denies: Fatigue, Fever, Chills, Malaise, Weakness, Poor appetite

, Diaphoresis, Night sweats





- Eyes


Eyes: denies: Pain, Irritation, Amaurosis, Blurred vision, Spots in vision, 

Field loss, Vision loss, Dipolpia, Corrective lenses





- Ears, Nose & Throat


Ears, Nose & Throat: denies: Ear pain, Hearing loss, Hearing aids, Tinnitus, 

Vertigo, Nasal pain, Nosebleeds, Nasal obstruction, Nasal congestion, Dentures, 

Sore throat, Mouth lesions, Bleeding gums





- Cardiovascular


Cariovascular: denies: Irregular heart rate, Palpitations, Chest pain, Edema, 

Lightheadedness, Syncope, Exertional dyspnea, Decr. exercise tolerance





- Respiratory


Respiratory: denies: Cough, Sputum production, Wheezing, Snoring, SOB at rest, 

SOB with exertion





- Gastrointestinal


Gastrointestinal: denies: Abdominal pain, Abdominal distention, Constipation, 

Diarrhea, Change in bowel habits, Rectal bleeding, Black stools, Bloody stools, 

Nausea, Vomiting, Joe blood emesis, Coffee grounds emesis, Reflux/heartburn, 

Bloating





- Genitourinary


Genitourinary: reports: Incontinence.  denies: Dysuria, Frequency, Urgency, 

Hematuria, Flank pain, Nocturia, Urethral discharge





- Musculoskeletal


Musculoskeletal: reports: Back pain.  denies: Muscle pain, Muscle aches, 

Stiffness, Limited range of motion, Muscle weakness, Gout, Joint pain





- Integumentary


Integumentary: denies: Rash, Pruritis, Lesions, Dryness, Lumps, Acne, Pigment 

changes





- Neurological


Neurological: reports: Pre-existing deficit, Abnormal gait.  denies: General 

weakness, Focal weakness, Headache, Dizziness, Numbness, Memory problems, 

Seizures, Incoordination, Slurred speech





- Psychiatric


Psychiatric: denies: Depression, Anxiety, Suicidal, Delusions, Hallucinations, 

Homicidal





- Endocrine


Endocrine: denies: Polyuria, Polydypsia, Polyphagia, Intolerance to cold





- Hematologic/Lymphatic


Hematologic/Lymphatic: denies: Anemia, Bruising, Petechiae, Blood clots, 

Lymphadenopathy, Bleeding tendencies





Exam





- Vital Signs


Reviewed Vital Signs: Yes


Vital Signs: 





 Vital Signs x48h











  Temp Pulse Resp BP Pulse Ox


 


 18 17:36  37.6 C H  99  20  110/60  97


 


 18 15:51   122 H  26 H   98


 


 18 15:47   129 H  20  124/84 H  95


 


 18 13:46   95   


 


 18 13:43   143 H  18  139/110 H  95


 


 18 13:40   126 H  16  139/110 H 


 


 18 11:39   112 H  20  145/89 H  96














- Physical Exam


General Appearance: positive: Alert, Mild distress.  negative: Lethargic


Eyes Bilateral: positive: Normal inspection, PERRL, No lid inflammation, 

Conjunctivae nml


ENT: positive: ENT inspection nml, Pharynx nml, No signs of dehydration.  

negative: Purulent nasal drainage, Pharyngeal erythema, Oral lesions


Neck: positive: Nml inspection, Thyroid nml, No JVD, Trachea midline.  negative

: Thyromegaly, Lymphadenopathy (R), Lymphadenopathy (L), Stiff neck, Carotid 

bruit, Swelling/bruising, Tracheal deviation


Respiratory: positive: Chest non-tender, No respiratory distress, Breath sounds 

nml.  negative: Wheezes, Rales, Rhonchi


Cardiovascular: positive: Regular rate & rhythm, No murmur, No gallop.  negative

: Irregularly irregular, Extrasystoles, Tachycardia, Bradycardia, Systolic 

murmur, Diastolic murmur


Peripheral Pulses: positive: 2+


Abdomen: positive: Non-tender, No organomegaly, Nml bowel sounds, No 

distention.  negative: Tenderness, Guarding, Rebound


Back: positive: Nml inspection, Other (severe lower back pain when pt move to 

different positions).  negative: CVA tenderness (R), CVA tenderness (L)


Skin: positive: Color nml, No rash, Warm, Dry.  negative: Cyanosis, Diaphoresis

, Pallor


Extremities: positive: Non-tender, Nml appearance.  negative: Calf tenderness, 

Joint swelling, Adiana's sign/cords


Neurologic/Psychiatric: positive: Mood/affect nml.  negative: Sensory loss, 

Facial droop, Slurred/abnml speech, Depressed mood/affect





Conclusion/Plan





- Problem List


(1) Back pain


Conclusion/Plan: 


pt had fall two weeks ago, since that, pt continued to have lower back pain. 

The assessment show pt had a positional pain. It indicates injury


CT and Xray did not reveals acute findings. pt also complains some pain at 

right lower abdomen.


MRI of lumber, and CT of abdomen, follow up


pain control, since pt decline opiates.


Qualifiers: 


   Back pain location: low back pain   Chronicity: acute   Back pain laterality

: midline   Sciatica presence: without sciatica   Qualified Code(s): M54.5 - 

Low back pain   





(2) Tachycardia


Conclusion/Plan: 


it appear from pain, afib with RVR 


on tele and vital monitor


pain control








(3) Afib


Conclusion/Plan: 


resume Cardizem


on tele and vital monitor


resume Coumadin








(4) History of CVA (cerebrovascular accident)


Conclusion/Plan: 


stable, without acute focus neuro deficit


resume home meds


support








(5) Hypothyroidism


Conclusion/Plan: 


stable, check TSH








(6) HTN (hypertension)


Conclusion/Plan: 


stable, resume home meds








(7) DVT prophylaxis


Conclusion/Plan: 


SCD, pt has on Coumadin








(8) Full code status


Conclusion/Plan: 


full code








- Lab Results


Fish Bones: 


 18 06:00





 18 06:00





Core Measures





- Anticipated LOS


I expect patient to be DC'd or transferred within 96 hours.: Yes





- DVT/VTE - Prophylaxis


VTE/DVT Device ordered at admit?: Yes


VTE/DVT Prophylaxis med ordered at admit?: No

## 2018-05-17 NOTE — XRAY PRELIMINARY REPORT
Accession: [id number]

Exam: XR CHEST 1 VIEW X-RAY

 

IMPRESSION: 

1. Lungs clear and normal in volume. 

2. Mild cardiomegaly, as before.

 

RADI

 

SITE ID: [id number]

## 2018-05-17 NOTE — CT PRELIMINARY REPORT
Accession: [id number]

Exam: CT ABDOMEN/PELVIS W/O

 

IMPRESSION: 

1. Mild cardiomegaly. 

2. Equivocal cholelithiasis. 

3. Mild right renal atrophy. 

4. Colonic diverticulosis. 

5. No radiographic explanation for this lady's presenting symptoms.

 

RADIA

 

SITE ID: [id number]

## 2018-05-17 NOTE — CT PRELIMINARY REPORT
Accession: [id number]

Exam: CT LUMBAR SPINE W/O

 

IMPRESSION: 

1. Chronic-appearing mild superior L2 and inferior L3 central endplate compression deformities. 

2. No acute fracture is identified. 

3. Diffuse osteopenia. 

4. Moderate degenerative disk disease at L4-L5.

 

RADIA

 

SITE ID: [id number]

## 2018-05-17 NOTE — XRAY REPORT
EXAM:

LUMBOSACRAL SPINE RADIOGRAPHY

 

EXAM DATE: 5/17/2018 11:19 AM.

 

CLINICAL HISTORY: Low back pain;  low impact fall one week ago.

 

COMPARISONS: Chest radiography 03/09/2018.

 

TECHNIQUE: 3 supine views.

 

FINDINGS:

Alignment: No scoliosis or significant spondylolisthesis.

 

Bones: Five non-rib-bearing lumbar vertebral bodies are present. Mild superior endplate loss of heigh
t at L2 is similar to prior and may be chronic. No evidence of acute fracture.

 

Disks: Moderate decreased disk height, most pronounced at L4-L5 and L5-S1.

 

Facets: Mild to moderate multilevel facet arthropathy.

 

Sacroiliac Joints: Unremarkable.

 

Soft Tissues: Nonobstructive bowel gas pattern. Aortic atherosclerosis.

 

IMPRESSION: No evidence of acute osseous abnormality in the lumbar spine.

 

RADIA

Referring Provider Line: [phone]

 

SITE ID: [id number]

## 2018-05-17 NOTE — ED PHYSICIAN DOCUMENTATION
PD HPI BACK PAIN





- Stated complaint


Stated Complaint: BACK PX





- Chief complaint


Chief Complaint: Back Pain





- History obtained from


History obtained from: Patient, Family (daughter), EMS





- History of Present Illness


Location: Lower


Quality: Pain


Associated symptoms: Incontinent of urine





- Additional information


Additional information: 


The patient is an 89-year-old female who arrives via ambulance, complaining of 

back pain.  She has had urinary incontinence with foul-smelling urine. She 

began complaining of dysuria yesterday. There is no history of fever, nausea or 

vomiting.  The patient was recently discharged from [facility] 5 days 

ago.  Her daughter states that while at [facility] the patient had 

experienced a low impact fall one week ago, and she is concerned that there may 

have been an injury to her back from that episode.


Review of her medical record reveals that she was hospitalized here in March 2018, 2 months ago for urinary tract infection with encephalopathy.  After IV 

antibiotic and IV fluid therapy she was discharged to [facility] at 

that time.  She has a history of atrial fibrillation, and suffered an embolic 

stroke in 2016.








Review of Systems


Constitutional: denies: Fever


Cardiac: denies: Chest pain / pressure


Respiratory: denies: Dyspnea, Cough


GI: denies: Abdominal Pain, Nausea, Vomiting


: reports: Incontinent


Skin: denies: Rash


Musculoskeletal: reports: Back pain.  denies: Extremity pain


Neurologic: reports: Generalized weakness.  denies: Focal weakness, Numbness, 

Headache





PD PAST MEDICAL HISTORY





- Past Medical History


Cardiovascular: Congestive heart failure, Atrial fibrillation


Respiratory: Asthma


Endocrine/Autoimmune: None, HyPOthyroidism


GI: GERD


: Incontinence


HEENT: None


Psych: None


Musculoskeletal: None, Fatigue


Derm: None





- Past Surgical History


Past Surgical History: No


Neuro: Radical neck





- Present Medications


Home Medications: 


 Ambulatory Orders











 Medication  Instructions  Recorded  Confirmed


 


Diltiazem HCl [Diltiazem 24Hr ER] 240 mg PO DAILY 08/14/14 05/17/18


 


Acetaminophen [Tylenol] 650 mg PO Q4HR PRN  tablet 03/12/18 05/17/18


 


Calcium Carbonate [Tums (Calcium 500 mg PO DAILY #0 tablet 03/12/18 05/17/18





Carbonate 500mg)]   


 


Furosemide [Lasix] 20 mg PO DAILY #0 03/12/18 05/17/18


 


Levothyroxine [Synthroid] 25 mcg PO QPM #0 03/12/18 05/17/18


 


Multivitamin [Theragran] 1 tab PO DAILYWM #0 tablet 03/12/18 05/17/18


 


Potassium Chloride 10 meq PO DAILY #0 03/12/18 05/17/18


 


Warfarin [Coumadin] 1.5 mg PO DAILY #0 03/12/18 05/17/18


 


Pravastatin Sodium [Pravastatin 10 mg PO DAILY 05/17/18 05/17/18





Sodium]   














- Allergies


Allergies/Adverse Reactions: 


 Allergies











Allergy/AdvReac Type Severity Reaction Status Date / Time


 


No Known Drug Allergies Allergy   Verified 11/08/16 05:37














- Living Situation


Living Situation: reports: With family


Living Arrangement: reports: At home





- Social History


Does the pt smoke?: No


Smoking Status: Never smoker


Does the pt drink ETOH?: No


Does the pt have substance abuse?: No





- Immunizations


Immunizations are current?: Yes





- POLST


Patient has POLST: Yes


POLST Status: Full Code





PD ED PE NORMAL





- Vitals


Vital signs reviewed: Yes (mildly tachycardia)





- General


General: Other (Alert elderly female who is disoriented to date.)





- HEENT


HEENT: Atraumatic, Moist mucous membranes





- Neck


Neck: No bony TTP, No JVD





- Cardiac


Cardiac: Other (Slightly rapid rate, irregularly irregular rhythm.)





- Respiratory


Respiratory: No respiratory distress, Clear bilaterally





- Abdomen


Abdomen: Soft, Non tender





- Back


Back: No CVA TTP, Other (There is diffuse tenderness to palpation over the 

lower lumbar region, without specific point tenderness.)





- Derm


Derm: No rash, Other (No sacral decubiti.)





- Extremities


Extremities: No edema, No calf tenderness / cord





- Neuro


Neuro: Other (Alert but disoriented regarding date.  Left preferential gaze.  

Generalized weakness with no focal motor deficit.)


Eye Opening: Spontaneous


Motor: Obeys Commands


Verbal: Confused


GCS Score: 14





Results





- Vitals


Vitals: 


 Vital Signs - 24 hr











  05/17/18 05/17/18 05/17/18





  09:13 09:41 11:39


 


Temperature 36.2 C L  


 


Heart Rate 116 H  112 H


 


Respiratory 16  20





Rate   


 


Blood Pressure  137/87 H 145/89 H


 


O2 Saturation 96  96














  05/17/18 05/17/18 05/17/18





  13:40 13:43 13:46


 


Temperature   


 


Heart Rate 126 H 143 H 95


 


Respiratory 16 18 





Rate   


 


Blood Pressure 139/110 H 139/110 H 


 


O2 Saturation  95 














  05/17/18 05/17/18 05/17/18





  15:47 15:51 17:36


 


Temperature   37.6 C H


 


Heart Rate 129 H 122 H 99


 


Respiratory 20 26 H 20





Rate   


 


Blood Pressure 124/84 H  110/60


 


O2 Saturation 95 98 97








 Oxygen











O2 Source [Without Activity]   Room air


 


O2 Source                      Room air

















- EKG (time done)


  ** 10:04


Rate: Rate (enter#) (118)


Rhythm: Atrial fibrillation


QRS: Low voltage


Compare to prior EKG: Unchanged from prior EKG (dated 11/14/2016.)


Computer interpretation: Agree with computer





- Labs


Labs: 


 Laboratory Tests











  05/17/18 05/17/18 05/17/18





  09:56 09:56 09:56


 


WBC    6.8


 


RBC    3.64 L


 


Hgb    11.7 L


 


Hct    34.8 L


 


MCV    95.4


 


MCH    32.1 H


 


MCHC    33.6


 


RDW    14.6


 


Plt Count    250


 


MPV    7.2 L


 


Neut #    5.5


 


Lymph #    0.8 L


 


Mono #    0.4


 


Eos #    0.2


 


Baso #    0.0


 


Absolute Nucleated RBC    0.00


 


Nucleated RBC %    0.0


 


PT   27.1 H 


 


INR   2.5 H 


 


Sodium  130 L  


 


Potassium  4.1  


 


Chloride  93 L  


 


Carbon Dioxide  27  


 


Anion Gap  10.0  


 


BUN  13  


 


Creatinine  0.8  


 


Estimated GFR (MDRD)  68 L  


 


Glucose  100  


 


Lactic Acid   


 


Calcium  8.2 L  


 


Total Bilirubin  1.1 H  


 


AST  20  


 


ALT  15  


 


Alkaline Phosphatase  66  


 


Total Protein  7.6  


 


Albumin  3.7  


 


Globulin  3.9  


 


Albumin/Globulin Ratio  0.9 L  


 


Lipase  19 L  


 


Urine Color   


 


Urine Clarity   


 


Urine pH   


 


Ur Specific Gravity   


 


Urine Protein   


 


Urine Glucose (UA)   


 


Urine Ketones   


 


Urine Occult Blood   


 


Urine Nitrite   


 


Urine Bilirubin   


 


Urine Urobilinogen   


 


Ur Leukocyte Esterase   


 


Urine RBC   


 


Urine WBC   


 


Ur Squamous Epith Cells   


 


Urine Bacteria   


 


Ur Microscopic Review   


 


Urine Culture Comments   














  05/17/18 05/17/18





  10:38 11:05


 


WBC  


 


RBC  


 


Hgb  


 


Hct  


 


MCV  


 


MCH  


 


MCHC  


 


RDW  


 


Plt Count  


 


MPV  


 


Neut #  


 


Lymph #  


 


Mono #  


 


Eos #  


 


Baso #  


 


Absolute Nucleated RBC  


 


Nucleated RBC %  


 


PT  


 


INR  


 


Sodium  


 


Potassium  


 


Chloride  


 


Carbon Dioxide  


 


Anion Gap  


 


BUN  


 


Creatinine  


 


Estimated GFR (MDRD)  


 


Glucose  


 


Lactic Acid   0.9


 


Calcium  


 


Total Bilirubin  


 


AST  


 


ALT  


 


Alkaline Phosphatase  


 


Total Protein  


 


Albumin  


 


Globulin  


 


Albumin/Globulin Ratio  


 


Lipase  


 


Urine Color  YELLOW 


 


Urine Clarity  CLEAR 


 


Urine pH  5.5 


 


Ur Specific Gravity  <=1.005 


 


Urine Protein  NEGATIVE 


 


Urine Glucose (UA)  NEGATIVE 


 


Urine Ketones  NEGATIVE 


 


Urine Occult Blood  SMALL H 


 


Urine Nitrite  NEGATIVE 


 


Urine Bilirubin  NEGATIVE 


 


Urine Urobilinogen  0.2 (NORMAL) 


 


Ur Leukocyte Esterase  NEGATIVE 


 


Urine RBC  6-10 H 


 


Urine WBC  0-3 


 


Ur Squamous Epith Cells  NONE SEEN 


 


Urine Bacteria  Rare 


 


Ur Microscopic Review  INDICATED 


 


Urine Culture Comments  NOT INDICATED 














- Rads (name of study)


  ** lumbar spine


Radiology: Prelim report reviewed, EMP read contemporaneously, See rad report (

No evidence of acute osseous abnormality.)





  ** 1-view CXR


Radiology: Prelim report reviewed, EMP read contemporaneously, See rad report (

Mild cardiomegaly.  Clear lungs clear and normal volume.)





Procedures





- General procedure


General procedure: 


Procedure: External jugular IV placement.


After multiple attempts for peripheral IV access per the nurses, I was asked to 

place an IV in this patient.  She was placed in Trendelenburg, and after 

sterile prep, an external jugular catheter was placed on the right without 

difficulty, using a 20-gauge angiocatheter.





PD MEDICAL DECISION MAKING





- ED course


Complexity details: reviewed old records, reviewed results, re-evaluated patient

, considered differential, d/w patient, d/w family, d/w consultant


ED course: 





Patient presents with acute low back pain, the etiology of which is 

undetermined at this time.  She normally ambulates with the assistance of a 

walker, but she is unable to get up from the bedside today because of pain in 

her lower back.  Urinalysis reveals no evidence of a urinary tract infection.  

CT scan of her lumbar spine reveals degenerative joint disease and old 

compression fractures, without evidence of acute fracture.  Her presentation 

does not suggest referred pain from intra-abdominal etiology.  Nurses report 

that the patient was difficult to catheterize because of uterine prolapse, but 

that is unlikely to cause the patient's acute pain.  Spinal stenosis remains a 

consideration, and MRI may be a useful diagnostic consideration.


Treatment in the emergency department included administration of diltiazem 10 

mg IV for tachycardia to 140.  Her normal daily dose of diltiazem 240 mg was 

administered orally. Her tachycardia resolved. Ofirmev 1 g was administered IV.

  On repeated examinations the patient is unable to sit up because of pain in 

her lower back.  Although she normally is able to ambulate with a walker, she 

is unable to sit up or stand from the bed.  I discussed her condition with Dr. Nascimento, who accepts her for further evaluation and treatment.





Departure





- Departure


Disposition: ED Place in Observation


Clinical Impression: 


 Chronic atrial fibrillation





Back pain


Qualifiers:


 Back pain location: low back pain Chronicity: acute Back pain laterality: 

midline Sciatica presence: without sciatica Qualified Code(s): M54.5 - Low back 

pain





Condition: Stable


Discharge Date/Time: 05/17/18 18:45

## 2018-05-18 VITALS — SYSTOLIC BLOOD PRESSURE: 108 MMHG | DIASTOLIC BLOOD PRESSURE: 45 MMHG

## 2018-05-18 LAB
ALBUMIN DIAFP-MCNC: 3.1 G/DL (ref 3.2–5.5)
ALBUMIN/GLOB SERPL: 0.9 {RATIO} (ref 1–2.2)
ALP SERPL-CCNC: 53 IU/L (ref 42–121)
ALT SERPL W P-5'-P-CCNC: 12 IU/L (ref 10–60)
ANION GAP SERPL CALCULATED.4IONS-SCNC: 8 MMOL/L (ref 6–13)
AST SERPL W P-5'-P-CCNC: 19 IU/L (ref 10–42)
BASOPHILS NFR BLD AUTO: 0 10^3/UL (ref 0–0.1)
BASOPHILS NFR BLD AUTO: 0.7 %
BILIRUB BLD-MCNC: 1.1 MG/DL (ref 0.2–1)
BUN SERPL-MCNC: 16 MG/DL (ref 6–20)
CALCIUM UR-MCNC: 7.5 MG/DL (ref 8.5–10.3)
CHLORIDE SERPL-SCNC: 98 MMOL/L (ref 101–111)
CO2 SERPL-SCNC: 26 MMOL/L (ref 21–32)
CREAT SERPLBLD-SCNC: 1 MG/DL (ref 0.4–1)
EOSINOPHIL # BLD AUTO: 0.3 10^3/UL (ref 0–0.7)
EOSINOPHIL NFR BLD AUTO: 5.4 %
ERYTHROCYTE [DISTWIDTH] IN BLOOD BY AUTOMATED COUNT: 15.1 % (ref 12–15)
GFRSERPLBLD MDRD-ARVRAT: 52 ML/MIN/{1.73_M2} (ref 89–?)
GLOBULIN SER-MCNC: 3.4 G/DL (ref 2.1–4.2)
GLUCOSE SERPL-MCNC: 92 MG/DL (ref 70–100)
HGB UR QL STRIP: 9.7 G/DL (ref 12–16)
INR PPP: 3.5 (ref 0.8–1.2)
LYMPHOCYTES # SPEC AUTO: 0.8 10^3/UL (ref 1.5–3.5)
LYMPHOCYTES NFR BLD AUTO: 12.4 %
MAGNESIUM SERPL-MCNC: 1.9 MG/DL (ref 1.7–2.8)
MCH RBC QN AUTO: 31.6 PG (ref 27–31)
MCHC RBC AUTO-ENTMCNC: 32.7 G/DL (ref 32–36)
MCV RBC AUTO: 96.5 FL (ref 81–99)
MONOCYTES # BLD AUTO: 0.4 10^3/UL (ref 0–1)
MONOCYTES NFR BLD AUTO: 5.9 %
NEUTROPHILS # BLD AUTO: 4.8 10^3/UL (ref 1.5–6.6)
NEUTROPHILS # SNV AUTO: 6.3 X10^3/UL (ref 4.8–10.8)
NEUTROPHILS NFR BLD AUTO: 75.6 %
PDW BLD AUTO: 7.3 FL (ref 7.9–10.8)
PLATELET # BLD: 199 10^3/UL (ref 130–450)
PROT SPEC-MCNC: 6.5 G/DL (ref 6.7–8.2)
PROTHROM ACT/NOR PPP: 38.2 SECS (ref 9.9–12.6)
RBC MAR: 3.08 10^6/UL (ref 4.2–5.4)
SODIUM SERPLBLD-SCNC: 132 MMOL/L (ref 135–145)

## 2018-05-18 RX ADMIN — KETOROLAC TROMETHAMINE PRN MG: 15 INJECTION, SOLUTION INTRAMUSCULAR; INTRAVENOUS at 10:43

## 2018-05-18 RX ADMIN — SODIUM CHLORIDE, PRESERVATIVE FREE SCH: 5 INJECTION INTRAVENOUS at 09:12

## 2018-05-18 RX ADMIN — SODIUM CHLORIDE, PRESERVATIVE FREE SCH: 5 INJECTION INTRAVENOUS at 03:23

## 2018-05-18 RX ADMIN — SODIUM CHLORIDE SCH MLS/HR: 9 INJECTION, SOLUTION INTRAVENOUS at 10:43

## 2018-05-18 RX ADMIN — KETOROLAC TROMETHAMINE PRN MG: 15 INJECTION, SOLUTION INTRAMUSCULAR; INTRAVENOUS at 03:20

## 2018-05-18 NOTE — MRI PRELIMINARY REPORT
Accession: [id number]

Exam: MRI LUMBAR SPINE W/O

 

IMPRESSION: 

1. Note is made of a complex acute sacral fracture with associated edema. There is a transverse compo
nent involving the second and third sacral segments and a vertically oriented component involving eac
h sacral ala. 

2. No acute or subacute compression fracture seen in the lumbar vertebral bodies. 

3. No central canal or foraminal stenosis

 

 

 

[facility]IA

 

SITE ID: [id number]

## 2018-05-18 NOTE — DISCHARGE PLAN
Discharge Plan for SNF / STEPHEN





- DC Plan and Transition Orders


Disposition: 03 SNF DC/Xfer


Condition: Poor


SNF Transition Orders: 





Admit to: [[facility]] under the care of [Doctor Bera Soni]





Discharge Diagnosis:  


[Sacral fracture, hx of CVA, afib, HTN, Hypothyroidism]





Medicare Certification: I certify that [facility] skilled nursing care is 

medically necessary on a continuing basis for any of the conditions for which 

she/he is receiving care during hospitalization.





 Notify PCP of admission and forward orders to primary provider for signature.





Weight on admission and [47kg].  Call PCP immediately if weight increases by [4

] pounds or if patient develops dyspnea, chest pain/tightness or edema.





House Bowel Program: [Yes]


If no BM after 2 days, nurse may give M.O.M. 30ml PO PRN and /or ducolax Supp 1 

AL and /or KATIA 250mg P.O., and/or senna 1-2 tabs PO.  On day 3 nurse may give 

repeat above order until residents constipation is resolved. 





Immunizations:





Annual Influenza Vaccine: [Yes].


(between Sept 1st and March 31st.) Unless allergy or already given





Two-Step PPD: [Yes]


per [facility] [phone] or appropriate documentation of approved exceptions


   


Treatments & Other Orders: [may follow up Brea Bolaños at arrival to the 

facility, may have pain control with Tylenol ]   





Oxygen Orders: [PRN]      





Lab Tests or X-Rays Orders: [follow up Dr. Soni]





Orthopedic Orders: [n].








Medications:





PLEASE REFER TO THE DISCHARGE MEDICATION LIST.








Insulin Orders? [No]





Diagnosis: Diabetes


Initiate hypo and hyperglycemia protocols for BG <70 and BG >375.  May check BG 

prn for signs/symptoms of dysglycemia.





Frequency of BG checks: [AC/Meal/HS]





Basal Insulin:


   


   [] Lantus  100 units / ml inject subq as follows: []


   [] Other: []





Correction Insulin: -  Select the type of insulin below





   [Choose: Novolog/Humalog]100 units /ml insulin inject subq per orders 

indicate below














[] LOW DOSE


  [] MODERATE DOSE


  [] MODERATE/HIGH     


       DOSE [] HIGH DOSE


 


 


    GB               UNITS       GB               UNITS         GB             

  UNITS    GB               UNITS


 


 


         0 UNITS          0 UNITS          0 UNITS     

     0 UNITS


 


 


141-175       1 UNITS 141-175       1 UNITS 141-175       2 UNITS 141-175       

3 UNITS


 


 


176-225       2 UNITS 176-225       3 UNITS 176-225       4 UNITS 176-225       

5 UNITS


 


 


226-275       3 UNITS 226-275       5 UNITS 226-275       6 UNITS 226-275       

7 UNITS


 


 


276-325       4 UNITS 276-325       7 UNITS 276-325       8 UNITS 276-325       

9 UNITS


 


 


326-375       5 UNITS 326-375       9 UNITS 326-375      10 UNITS 326-375      

11 UNITS


 


 


>375    CONTACT MD >375    CONTACT MD >375    CONTACT MD >375    CONTACT MD


 











Custom Dosing: 





   [Choose: None/Novolog/Humalog] 100 units/ml Insulin inject subq as follows:











   GB    Units


 


 [] Units


 


141-175 [] Units


 


176-225 [] Units


 


226-275 [] Units


 


276-325 []Units


 


326-375 [] Units


 


>375 Contact MD














Allergies and Adverse Reactions: 


 Allergies











Allergy/AdvReac Type Severity Reaction Status Date / Time


 


No Known Drug Allergies Allergy   Verified 11/08/16 05:37














- Medications


New Prescriptions: 


Acetaminophen [Tylenol] 650 mg PO Q6HR #15 tablet





- Diet


Type: Geriatric


Texture: Dysphagia mech


Liquids: Honey thick


May have monthly special meal: Yes





- Therapies | Activity


Therapy: Evaluation | Treat if indicated: PT, OT, Swallowing / ST


Rehabilitation Potential: Maximize functional status


Activity: Activity as Tolerated


Additional Instructions: 


may follow up Brea Bolaños at arrival to the facility, may have pain 

control with Tylenol

## 2018-05-18 NOTE — DISCHARGE SUMMARY
Discharge Summary


Discharge Date: 05/18/18


Discharging Provider: NIDHI COMBS


Primary Care Provider: Brea Bolaños


Condition at Discharge: Poor


Discharge Disposition: 03 SNF DC/Xfer


Discharge Facility Name: [facility]





- DIAGNOSES


Admission Diagnoses: 


(1) Back pain





(2) Tachycardia





(3) Afib





(4) History of CVA (cerebrovascular accident)





(5) Hypothyroidism





(6) HTN (hypertension)











Discharge Diagnoses with Status of Each Condition: 


(1) Back pain


controlled by Tylenol. MRI reveals sacral fracture. discuss with pt's daughter 

all test result, treatment options and D/C plan. Daughter request only 

scheduled Tylenol for pain control.


(2) Tachycardia


resolved


(3) Afib


HR is controlled. continue Coumadin, continue to be managed by PCP


(4) History of CVA (cerebrovascular accident)


continue to be managed by PCP, and SNF for train.


(5) Hypothyroidism


stable


(6) HTN (hypertension)


stable


(7) liver lesion


US reveals simple cyst.





- HPI


History of Present Illness: 


Patient is an 89-year-old female with past medical history significant for 

hypertension, hypothyroidism, COPD, vitamin D deficiency, history of stroke in 

2016 with minimal residual deficits and atrial fibrillation on Coumadin, UTI, 

and encephalopathy, who presented to the emergency department with chief 

complaint of lower back pain. Pt was recently discharged from [facility] for her 

stroke rehab. Per pt's daughter, pt had two fall recently. The least one was 05/ 03/18 when Pt fell from the chair. Since that, pt continue to complaint of 

lower back pain. Pt did not have other injuries or pain from the falls. Pt 

appears lower back position pain when Pt was removed from one position to 

another position.Pt was reported to have urinary incontinence with foul-

smelling urine. She had complaining of dysuria yesterday. Pt denies chest pain, 

fever, chill, cough, shortness of breath. Urinalysis reveals no evidence of a 

urinary tract infection.  CT scan and Xray of her lumbar spine reveals 

degenerative joint disease and old compression fractures, without acute 

fracture.  the etiology of which causes pt's pain is undetermined at this time. 

MRI may be a useful diagnostic consideration for pt. Pt's daughter declines any 

opiates for pt to control her pain. Pt is admitted in Observation unit for 

further evaluation and treatment.











- ALLERGIES


Allergies/Adverse Reactions: 


 Allergies











Allergy/AdvReac Type Severity Reaction Status Date / Time


 


No Known Drug Allergies Allergy   Verified 11/08/16 05:37














- MEDICATIONS


Home Medications: 


 Ambulatory Orders











 Medication  Instructions  Recorded  Confirmed


 


Diltiazem HCl [Diltiazem 24Hr ER] 240 mg PO DAILY 08/14/14 05/17/18


 


Acetaminophen [Tylenol] 650 mg PO Q4HR PRN  tablet 03/12/18 05/17/18


 


Calcium Carbonate [Tums (Calcium 500 mg PO DAILY #0 tablet 03/12/18 05/17/18





Carbonate 500mg)]   


 


Furosemide [Lasix] 20 mg PO DAILY #0 03/12/18 05/17/18


 


Levothyroxine [Synthroid] 25 mcg PO QPM #0 03/12/18 05/17/18


 


Multivitamin [Theragran] 1 tab PO DAILYWM #0 tablet 03/12/18 05/17/18


 


Potassium Chloride 10 meq PO DAILY #0 03/12/18 05/17/18


 


Warfarin [Coumadin] 1.5 mg PO DAILY #0 03/12/18 05/17/18


 


Pravastatin Sodium 10 mg PO DAILY 05/17/18 05/17/18


 


Acetaminophen [Tylenol] 650 mg PO Q6HR #15 tablet 05/18/18 














- PHYSICAL EXAM AT DISCHARGE


General Appearance: positive: No acute distress, Alert.  negative: Lethargic


Eyes Bilateral: positive: Normal inspection, PERRL, No lid inflammation, 

Conjunctivae nml


ENT: positive: ENT inspection nml, Pharynx nml, No signs of dehydration.  

negative: Purulent nasal drainage, Pharyngeal erythema, Oral lesions


Neck: positive: Nml inspection, Thyroid nml, No JVD, Trachea midline.  negative

: Thyromegaly, Lymphadenopathy (R), Lymphadenopathy (L), Stiff neck, Swelling/

bruising, Tracheal deviation


Respiratory: positive: Chest non-tender, No respiratory distress, Breath sounds 

nml.  negative: Wheezes, Rales, Rhonchi


Cardiovascular: positive: Regular rate & rhythm, No murmur, No gallop.  negative

: Irregularly irregular, Extrasystoles, Tachycardia, Bradycardia, Systolic 

murmur, Diastolic murmur


Peripheral Pulses: positive: 2+


Abdomen: positive: Non-tender, No organomegaly, Nml bowel sounds, No 

distention.  negative: Tenderness, Guarding, Rebound


Back: positive: Nml inspection.  negative: CVA tenderness (R), CVA tenderness (L

)


Skin: positive: Color nml, No rash, Warm, Dry.  negative: Cyanosis, Diaphoresis

, Pallor


Extremities: positive: Non-tender.  negative: Calf tenderness, Joint swelling, 

Daiana's sign/cords


Neurologic/Psychiatric: positive: Mood/affect nml, Weakness.  negative: Sensory 

loss, Facial droop, Slurred/abnml speech, Depressed mood/affect





- LABS


Result Diagrams: 


 05/18/18 06:00





 05/18/18 06:00





- FOLLOW UP


Follow Up: 


may follow up Brea Bolaños at arrival to the facility, may have pain 

control with Tylenol, continue SNF for strength training and prevention of fall.








- TIME SPENT


Time Spent in Discharge (Minutes): 50

## 2018-05-18 NOTE — ULTRASOUND REPORT
LIVER ULTRASOUND:  05/18/2018

 

CLINICAL INDICATION:   Lesions on MRI.

 

TECHNIQUE:  Real-time scanning was performed with representative static images obtained.

 

FINDINGS:  Ultrasound of the liver was performed.  The liver measures 13.6 cm.  There is an 8 

mm cyst in the right lobe, and an 8 mm hemangioma.  No suspicious solid liver lesion is seen.  

No intrahepatic biliary dilatation is appreciated.

 

IMPRESSION:  SIMPLE CYST AND HEMANGIOMA, ACCOUNTING FOR THE MRI ABNORMALITIES.  NO SUSPICIOUS 

SOLID LIVER LESION IS SEEN.

 

 

DD: 05/18/2018 13:54

TD: 05/18/2018 14:02

Job #: [id number]

## 2018-05-18 NOTE — MRI REPORT
EXAM:

MRI LUMBAR SPINE WITHOUT CONTRAST

 

EXAM DATE: 5/18/2018 08:07 AM.

 

CLINICAL HISTORY: Back pain.

 

COMPARISON: CT abdomen and pelvis 05/17/2018.

.

 

TECHNIQUE: Multiplanar, multisequence T1-weighted and fluid-sensitive sequences of the lumbar spine f
rom T12 to S1 without contrast. Other: None.

 

FINDINGS: 

 

A lobulated 1.8 cm T2 hyperintensity is present in the right hepatic lobe. This is seen as a region o
f low attenuation on the comparison abdominal CT. There is a smaller hypodensity more inferiorly in t
he right hepatic lobe measuring 8 mm in size. This might be present on comparison abdominal CT.

 

The distal tip of the conus medullaris is seen at the level of the superior L2 endplate.

 

No suspicious marrow replacement is present in the lumbar vertebral bodies. Mild loss of vertebral andrew
dy height is seen at L2 and at L3. There is no marrow edema present. A similar finding is seen involv
ing the T12 vertebral body.

 

There is edema seen involving the S2 and S3 vertebral bodies. There is edema in each sacral ala. Ther
e is cortical buckling involving the ventral aspect of the third sacral segment. There is linear low 
T1-weighted signal oriented transverse at S2 and S3. There is linear low T1 weighted signal oriented 
vertically in each sacral ala.

 

A minimal posterior disk protrusion is present from T12 through L3. Minimal posterior lateral disk pr
otrusions are seen at L3-L4 and L4-L5.

 

No central canal or foraminal stenosis.

 

Facet/ligament flavum hypertrophy is seen throughout the lumbar spine.

 

Ill-defined edema is seen in the presacral region on the sagittal images.

 

IMPRESSION: 

1. There is a complex sacral fracture. There is a transverse component involving the second and third
 sacral segments and a vertically oriented component involving each sacral ala. 

2. No acute or subacute compression fracture is seen in the lumbar vertebral bodies. 

3. No central canal or foraminal stenosis. 

4. At least one if not two  right hepatic lobe T2 hyperintense masses. Abdominal ultrasound would be 
of value to further characterize these lesions as to whether they reflect hepatic cysts or solid hepa
tic lesions

 

  

[facility]

Referring Provider Line: [phone]

 

SITE ID: [id number]

## 2018-05-29 ENCOUNTER — HOSPITAL ENCOUNTER (OUTPATIENT)
Dept: HOSPITAL 76 - EMS | Age: 83
Discharge: ANOTHER HOSPITAL | End: 2018-05-29
Attending: SURGERY
Payer: MEDICARE

## 2018-05-29 ENCOUNTER — HOSPITAL ENCOUNTER (INPATIENT)
Dept: HOSPITAL 76 - ED | Age: 83
LOS: 4 days | Discharge: NURSING, REHAB OR LONG-TERM CARE | DRG: 191 | End: 2018-06-02
Attending: NURSE PRACTITIONER | Admitting: NURSE PRACTITIONER
Payer: MEDICARE

## 2018-05-29 DIAGNOSIS — I11.0: ICD-10-CM

## 2018-05-29 DIAGNOSIS — I69.30: ICD-10-CM

## 2018-05-29 DIAGNOSIS — I48.91: ICD-10-CM

## 2018-05-29 DIAGNOSIS — E55.9: ICD-10-CM

## 2018-05-29 DIAGNOSIS — Z79.01: ICD-10-CM

## 2018-05-29 DIAGNOSIS — R06.02: Primary | ICD-10-CM

## 2018-05-29 DIAGNOSIS — Z86.69: ICD-10-CM

## 2018-05-29 DIAGNOSIS — E03.9: ICD-10-CM

## 2018-05-29 DIAGNOSIS — J44.1: Primary | ICD-10-CM

## 2018-05-29 DIAGNOSIS — R41.0: ICD-10-CM

## 2018-05-29 DIAGNOSIS — I50.9: ICD-10-CM

## 2018-05-29 DIAGNOSIS — E86.0: ICD-10-CM

## 2018-05-29 DIAGNOSIS — I12.9: ICD-10-CM

## 2018-05-29 DIAGNOSIS — N18.9: ICD-10-CM

## 2018-05-29 DIAGNOSIS — R05: ICD-10-CM

## 2018-05-29 DIAGNOSIS — Z79.899: ICD-10-CM

## 2018-05-29 DIAGNOSIS — E87.1: ICD-10-CM

## 2018-05-29 LAB
ANION GAP SERPL CALCULATED.4IONS-SCNC: 9 MMOL/L (ref 6–13)
BASOPHILS NFR BLD AUTO: 0 10^3/UL (ref 0–0.1)
BASOPHILS NFR BLD AUTO: 0.4 %
BUN SERPL-MCNC: 20 MG/DL (ref 6–20)
CALCIUM UR-MCNC: 8.2 MG/DL (ref 8.5–10.3)
CHLORIDE SERPL-SCNC: 99 MMOL/L (ref 101–111)
CO2 SERPL-SCNC: 28 MMOL/L (ref 21–32)
CREAT SERPLBLD-SCNC: 0.9 MG/DL (ref 0.4–1)
EOSINOPHIL # BLD AUTO: 0 10^3/UL (ref 0–0.7)
EOSINOPHIL NFR BLD AUTO: 0.4 %
ERYTHROCYTE [DISTWIDTH] IN BLOOD BY AUTOMATED COUNT: 15.3 % (ref 12–15)
GFRSERPLBLD MDRD-ARVRAT: 59 ML/MIN/{1.73_M2} (ref 89–?)
GLUCOSE SERPL-MCNC: 128 MG/DL (ref 70–100)
HGB UR QL STRIP: 10.4 G/DL (ref 12–16)
LYMPHOCYTES # SPEC AUTO: 1.2 10^3/UL (ref 1.5–3.5)
LYMPHOCYTES NFR BLD AUTO: 11.3 %
MCH RBC QN AUTO: 31.1 PG (ref 27–31)
MCHC RBC AUTO-ENTMCNC: 32.8 G/DL (ref 32–36)
MCV RBC AUTO: 94.7 FL (ref 81–99)
MONOCYTES # BLD AUTO: 0.5 10^3/UL (ref 0–1)
MONOCYTES NFR BLD AUTO: 4.4 %
NEUTROPHILS # BLD AUTO: 8.9 10^3/UL (ref 1.5–6.6)
NEUTROPHILS # SNV AUTO: 10.7 X10^3/UL (ref 4.8–10.8)
NEUTROPHILS NFR BLD AUTO: 83.5 %
PDW BLD AUTO: 6.6 FL (ref 7.9–10.8)
PLATELET # BLD: 348 10^3/UL (ref 130–450)
RBC MAR: 3.34 10^6/UL (ref 4.2–5.4)
SODIUM SERPLBLD-SCNC: 136 MMOL/L (ref 135–145)

## 2018-05-29 PROCEDURE — 36415 COLL VENOUS BLD VENIPUNCTURE: CPT

## 2018-05-29 PROCEDURE — 93005 ELECTROCARDIOGRAM TRACING: CPT

## 2018-05-29 PROCEDURE — 96376 TX/PRO/DX INJ SAME DRUG ADON: CPT

## 2018-05-29 PROCEDURE — 99284 EMERGENCY DEPT VISIT MOD MDM: CPT

## 2018-05-29 PROCEDURE — 84439 ASSAY OF FREE THYROXINE: CPT

## 2018-05-29 PROCEDURE — 84484 ASSAY OF TROPONIN QUANT: CPT

## 2018-05-29 PROCEDURE — 93306 TTE W/DOPPLER COMPLETE: CPT

## 2018-05-29 PROCEDURE — 87040 BLOOD CULTURE FOR BACTERIA: CPT

## 2018-05-29 PROCEDURE — 85025 COMPLETE CBC W/AUTO DIFF WBC: CPT

## 2018-05-29 PROCEDURE — 71045 X-RAY EXAM CHEST 1 VIEW: CPT

## 2018-05-29 PROCEDURE — 96374 THER/PROPH/DIAG INJ IV PUSH: CPT

## 2018-05-29 PROCEDURE — 83735 ASSAY OF MAGNESIUM: CPT

## 2018-05-29 PROCEDURE — 84481 FREE ASSAY (FT-3): CPT

## 2018-05-29 PROCEDURE — 82140 ASSAY OF AMMONIA: CPT

## 2018-05-29 PROCEDURE — 84443 ASSAY THYROID STIM HORMONE: CPT

## 2018-05-29 PROCEDURE — 99283 EMERGENCY DEPT VISIT LOW MDM: CPT

## 2018-05-29 PROCEDURE — 80053 COMPREHEN METABOLIC PANEL: CPT

## 2018-05-29 PROCEDURE — 82607 VITAMIN B-12: CPT

## 2018-05-29 PROCEDURE — 85610 PROTHROMBIN TIME: CPT

## 2018-05-29 PROCEDURE — 83605 ASSAY OF LACTIC ACID: CPT

## 2018-05-29 PROCEDURE — 80048 BASIC METABOLIC PNL TOTAL CA: CPT

## 2018-05-29 PROCEDURE — 94640 AIRWAY INHALATION TREATMENT: CPT

## 2018-05-29 PROCEDURE — 83880 ASSAY OF NATRIURETIC PEPTIDE: CPT

## 2018-05-29 RX ADMIN — METHYLPREDNISOLONE SODIUM SUCCINATE SCH MG: 40 INJECTION, POWDER, FOR SOLUTION INTRAMUSCULAR; INTRAVENOUS at 21:09

## 2018-05-29 RX ADMIN — ATORVASTATIN CALCIUM SCH MG: 10 TABLET, FILM COATED ORAL at 21:04

## 2018-05-29 RX ADMIN — IPRATROPIUM BROMIDE PRN MG: 0.5 SOLUTION RESPIRATORY (INHALATION) at 18:23

## 2018-05-29 RX ADMIN — WARFARIN SODIUM SCH MG: 1 TABLET ORAL at 17:41

## 2018-05-29 RX ADMIN — LEVALBUTEROL PRN MG: 1.25 SOLUTION RESPIRATORY (INHALATION) at 18:23

## 2018-05-29 RX ADMIN — SODIUM CHLORIDE, PRESERVATIVE FREE PRN ML: 5 INJECTION INTRAVENOUS at 21:04

## 2018-05-29 RX ADMIN — SODIUM CHLORIDE, PRESERVATIVE FREE SCH ML: 5 INJECTION INTRAVENOUS at 17:42

## 2018-05-29 RX ADMIN — ACETAMINOPHEN SCH MG: 325 TABLET ORAL at 17:41

## 2018-05-29 NOTE — XRAY PRELIMINARY REPORT
Accession: [id number]

Exam: XR CHEST 1 VIEW X-RAY

 

IMPRESSION: Increased lung markings suggestive of underlying airways disease

 

[facility]

 

SITE ID: [id number]

## 2018-05-29 NOTE — XRAY REPORT
EXAM: 

CHEST RADIOGRAPHY

 

EXAM DATE: 5/29/2018 10:24 AM.

 

CLINICAL HISTORY: Soa.

 

COMPARISON: Chest radiograph 05/17/2018.

 

TECHNIQUE: 1 view.

 

FINDINGS:

Lungs/Pleura: Increased lung markings. No focal opacities. No effusions.

 

Mediastinum: Stable

 

Other: None.

 

IMPRESSION: Increased lung markings suggestive of underlying airways disease

 

[facility]

Referring Provider Line: [phone]

 

SITE ID: [id number]

## 2018-05-29 NOTE — HISTORY & PHYSICAL EXAMINATION
Chief Complaint





- Chief Complaint


Chief Complaint: shortness of breath





History of Present Illness





- Admitted From


Admitted From:: ER





- History Obtained From


History obtained from: pt and her daughter





- History of Present Illness


HPI Comment/Other: 


Patient is an 89-year-old female with past medical history significant for 

hypertension, hypothyroidism, COPD, vitamin D deficiency, history of stroke in 

2016 with minimal residual deficits and atrial fibrillation on Coumadin, UTI, 

and encephalopathy, who presented to the emergency department with chief 

complaint of shortness of breath. Pt denies chest pain, fever, chill. Pt's 

daughter report pt feels shortness of breath and wheezing in the morning. Pt is 

current living at [facility]. Pt got improved when Duoneb wag given at ER. Lab 

test in ER was unremarkable. Pt is afebrile, sats is 99% at 3 liter of O2 with 

mild to moderate wheezing. Pt is admitted for COPD exacerbation in observation 

unit now. 





History





- Past Medical History


Cardiovascular: reports: Congestive heart failure, Atrial fibrillation


Respiratory: reports: Asthma


Endocrine/Autoimmune: reports: None, HyPOthyroidism


GI: reports: GERD


: reports: Incontinence


HEENT: reports: None


Psych: reports: None


Musculoskeletal: reports: None, Fatigue


Derm: reports: None


MRSA Hx?: No





- Past Surgical History


Neuro: reports: Radical neck





- Family & Social History


Family History: Mother:  (Dad was  from age 94 without medical 

history known), CAD, Father: 


Family History Comment/Other: pt has been living with her family in [facility]. Her  was  from [place]. Pt had one daughter as caregiver to her.


Social History Notes: pt denies cigarette smoking, alcohol and drug abuse.





- Substance History


Use: Uses substance without health or social issues: NONE





- POLST


Patient has POLST: Yes


POLST Status: Full Code





Meds/Allgy





- Home Medications


Home Medications: 


 Ambulatory Orders











 Medication  Instructions  Recorded  Confirmed


 


Diltiazem HCl [Diltiazem 24Hr ER] 240 mg PO DAILY 14


 


Multivitamin [Theragran] 1 tab PO DAILYWM #0 tablet 18


 


Potassium Chloride 10 meq PO DAILY #0 18


 


Pravastatin Sodium 10 mg PO QPM 18


 


Acetaminophen 650 mg PO Q4H PRN 18


 


Acetaminophen [Tylenol] 650 mg PO Q6H 18


 


Calcium Carbonate/Vitamin D3 1 tab PO DAILY 18





[Calcium 500-Vit D3 200 Tablet]   


 


Cyclobenzaprine HCl 10 mg PO TID PRN 18


 


Furosemide [Lasix] 20 mg PO DAILY 18


 


Levalbuterol HCl 1.25 mg INH BID 18


 


Levothyroxine [Synthroid] 25 mcg PO QDAC 18


 


Tramadol HCl 50 mg PO Q6H PRN 18


 


Warfarin [Coumadin] 1.5 mg PO 1700 18


 


guaiFENesin/DEXTROMETHORPHAN 10 ml PO Q4H PRN 18





[Robitussin Dm]   














- Allergies


Allergies/Adverse Reactions: 


 Allergies











Allergy/AdvReac Type Severity Reaction Status Date / Time


 


No Known Drug Allergies Allergy   Verified 16 05:37














Review of Systems





- Constitutional


Constitutional: denies: Fatigue, Fever, Chills, Malaise, Weakness, Poor appetite

, Diaphoresis, Night sweats





- Eyes


Eyes: denies: Pain, Irritation, Amaurosis, Blurred vision, Spots in vision, 

Field loss, Vision loss, Dipolpia





- Ears, Nose & Throat


Ears, Nose & Throat: denies: Ear pain, Hearing loss, Hearing aids, Vertigo, 

Nasal pain, Nasal discharge, Nosebleeds, Nasal congestion, Postnasal drainage, 

Dentures, Sore throat, Mouth lesions, Bleeding gums





- Cardiovascular


Cariovascular: denies: Irregular heart rate, Palpitations, Chest pain, Edema, 

Lightheadedness, Syncope, Exertional dyspnea, Decr. exercise tolerance





- Respiratory


Respiratory: reports: Cough, Sputum production, Wheezing, SOB with exertion.  

denies: Snoring, Hemoptysis, Orthopnea, SOB at rest





- Gastrointestinal


Gastrointestinal: denies: Constipation, Diarrhea, Change in bowel habits, 

Rectal bleeding, Black stools, Nausea, Vomiting, Bile emesis, Coffee grounds 

emesis, Reflux/heartburn, Bloating





- Genitourinary


Genitourinary: denies: Dysuria, Frequency, Urgency, Hematuria, Incontinence, 

Flank pain, Nocturia, Urethral discharge





- Musculoskeletal


Musculoskeletal: denies: Muscle pain, Back pain, Muscle aches, Stiffness, 

Limited range of motion, Muscle weakness, Gout, Joint pain





- Integumentary


Integumentary: denies: Rash, Pruritis, Lesions, Dryness, Lumps, Acne, Pigment 

changes, Nail changes





- Neurological


Neurological: reports: Pre-existing deficit, Abnormal gait, Slurred speech.  

denies: General weakness, Focal weakness, Headache, Dizziness, Numbness, Memory 

problems, Seizures, Incoordination





- Psychiatric


Psychiatric: denies: Depression, Anxiety, Suicidal, Delusions, Hallucinations, 

Homicidal





- Endocrine


Endocrine: denies: Polyuria, Polydypsia, Polyphagia, Intolerance to cold





- Hematologic/Lymphatic


Hematologic/Lymphatic: denies: Anemia, Bruising, Petechiae, Blood clots, 

Lymphadenopathy, Bleeding tendencies





Exam





- Vital Signs


Reviewed Vital Signs: Yes


Vital Signs: 





 Vital Signs x48h











  Temp Pulse Pulse Resp BP BP Pulse Ox


 


 18 16:00  36.6 C   113 H  16   113/51 L  98


 


 18 15:57   104 H   20  111/61   99














- Physical Exam


General Appearance: positive: No acute distress, Alert.  negative: Lethargic


Eyes Bilateral: positive: Normal inspection, PERRL, No lid inflammation, 

Conjunctivae nml


ENT: positive: ENT inspection nml, Pharynx nml, No signs of dehydration.  

negative: Purulent nasal drainage, Pharyngeal erythema, Oral lesions


Neck: positive: Nml inspection, Thyroid nml, No JVD, Trachea midline.  negative

: Thyromegaly, Lymphadenopathy (R), Lymphadenopathy (L), Stiff neck, Swelling/

bruising, Tracheal deviation


Respiratory: positive: Chest non-tender, No respiratory distress, Wheezes.  

negative: Breath sounds nml, Rales, Rhonchi


Cardiovascular: positive: Regular rate & rhythm, No murmur, Tachycardia.  

negative: Irregularly irregular, Extrasystoles, Bradycardia, Systolic murmur, 

Diastolic murmur


Peripheral Pulses: positive: 2+


Abdomen: positive: Non-tender, No organomegaly, Nml bowel sounds, No 

distention.  negative: Tenderness, Guarding, Rebound


Back: positive: Nml inspection.  negative: CVA tenderness (R), CVA tenderness (L

)


Skin: positive: Color nml, No rash, Warm, Dry.  negative: Cyanosis, Diaphoresis

, Pallor


Extremities: positive: Non-tender.  negative: Calf tenderness, Joint swelling, 

Daiana's sign/cords


Neurologic/Psychiatric: positive: Slurred/abnml speech.  negative: Sensory loss

, Facial droop, Depressed mood/affect





Conclusion/Plan





- Problem List


(1) Wheezing


Conclusion/Plan: 


pt with hx of COPD, it seem to be caused by COPD exacerbation


solu-metro


Xopenex


Ipro


O2 supplement as needed


lab and vital monitor








(2) Afib


Conclusion/Plan: 


100-110 HR, continue Cardizem


continue Coumadin


check PT/INR








(3) History of CVA (cerebrovascular accident)


Conclusion/Plan: 


it appears no new focus neurological deficit


continue home meds


continue support











(5) Hx of encephalopathy


Conclusion/Plan: 


pt is with hx of encephalopathy, present some confusion


check ammonia, B12, folic acid, TSH, follow up


neuro check








(6) Hypothyroidism


Conclusion/Plan: 


check TSH, follow up








(7) DVT prophylaxis


Conclusion/Plan: 


SCD, pt is on Coumadin 








(8) Full code status


Conclusion/Plan: 


full code status








- Lab Results


Fish Bones: 


 18 05:47





 18 05:47





Core Measures





- Anticipated LOS


I expect patient to be DC'd or transferred within 96 hours.: Yes





- DVT/VTE - Prophylaxis


VTE/DVT Device ordered at admit?: Yes


VTE/DVT Prophylaxis med ordered at admit?: No


Not Ordered - Medical Reason: Not indicated

## 2018-05-29 NOTE — ED PHYSICIAN DOCUMENTATION
History of Present Illness





- Stated complaint


Stated Complaint: SOA





- Chief complaint


Chief Complaint: Resp





- Additonal information


Additional information: 





hx from EMS - pt is somewhat confused and speaking tagolog


89 f


hx a fib CHF COPD


per EMS who got hx from [facility] pt has a chronic cough but is acutely more SOA today


pt denies CP


no reported fever


given duoneb enroute with improvement


was hypoxic








Review of Systems


Constitutional: denies: Fever


Cardiac: denies: Chest pain / pressure


Respiratory: reports: Dyspnea, Cough, Wheezing


GI: denies: Abdominal Pain, Vomiting, Diarrhea


Musculoskeletal: reports: Extremity swelling (mild)


Neurologic: denies: Generalized weakness





PD PAST MEDICAL HISTORY





- Past Medical History


Cardiovascular: Congestive heart failure, Atrial fibrillation


Respiratory: Asthma


Endocrine/Autoimmune: None, HyPOthyroidism


GI: GERD


: Incontinence


HEENT: None


Psych: None


Musculoskeletal: None, Fatigue


Derm: None





- Past Surgical History


Past Surgical History: No


Neuro: Radical neck





- Present Medications


Home Medications: 


 Ambulatory Orders











 Medication  Instructions  Recorded  Confirmed


 


Diltiazem HCl [Diltiazem 24Hr ER] 240 mg PO DAILY 08/14/14 05/29/18


 


Multivitamin [Theragran] 1 tab PO DAILYWM #0 tablet 03/12/18 05/29/18


 


Potassium Chloride 10 meq PO DAILY #0 03/12/18 05/29/18


 


Pravastatin Sodium 10 mg PO QPM 05/17/18 05/29/18


 


Acetaminophen 650 mg PO Q4H PRN 05/29/18 05/29/18


 


Acetaminophen [Tylenol] 650 mg PO Q6H 05/29/18 05/29/18


 


Calcium Carbonate/Vitamin D3 1 tab PO DAILY 05/29/18 05/29/18





[Calcium 500-Vit D3 200 Tablet]   


 


Cyclobenzaprine HCl 10 mg PO TID PRN 05/29/18 05/29/18


 


Furosemide [Lasix] 20 mg PO DAILY 05/29/18 05/29/18


 


Levalbuterol HCl 1.25 mg INH BID 05/29/18 05/29/18


 


Levothyroxine [Synthroid] 25 mcg PO QDAC 05/29/18 05/29/18


 


Tramadol HCl 50 mg PO Q6H PRN 05/29/18 05/29/18


 


Warfarin [Coumadin] 1.5 mg PO 1700 05/29/18 05/29/18


 


guaiFENesin/DEXTROMETHORPHAN 10 ml PO Q4H PRN 05/29/18 05/29/18





[Robitussin Dm]   














- Allergies


Allergies/Adverse Reactions: 


 Allergies











Allergy/AdvReac Type Severity Reaction Status Date / Time


 


No Known Drug Allergies Allergy   Verified 11/08/16 05:37














- Social History


Does the pt smoke?: No


Smoking Status: Never smoker


Does the pt drink ETOH?: No


Does the pt have substance abuse?: No





- Immunizations


Immunizations are current?: Yes





- POLST


Patient has POLST: Yes


POLST Status: Full Code





PD ED PE NORMAL





- Vitals


Vital signs reviewed: Yes





- General


General: Alert and oriented X 3





- Cardiac


Cardiac: RRR (tachy)





- Respiratory


Respiratory: Other (clarissa rales, tachypneic, some wheezing)





- Derm


Derm: Normal color





- Extremities


Extremities: Other (mild edema)





Results





- Vitals


Vitals: 


 Vital Signs - 24 hr











  05/29/18 05/29/18 05/29/18





  09:37 11:41 11:42


 


Temperature 36.9 C  


 


Heart Rate 93 99 96


 


Respiratory 22 16 16





Rate   


 


Blood Pressure 136/68 H 101/53 L 


 


O2 Saturation 100 99 














  05/29/18 05/29/18 05/29/18





  12:46 13:31 14:32


 


Temperature   


 


Heart Rate 120 H 106 H 104 H


 


Respiratory 21 22 20





Rate   


 


Blood Pressure 111/54 L 115/77 111/61


 


O2 Saturation 98 98 99








 Oxygen











O2 Source [Without Activity]   Room air


 


O2 Source                      Room air


 


Oxygen Flow Rate               2

















- EKG (time done)


  ** 0943


Rate: Rate (enter#) (143)


Rhythm: Atrial fibrillation


Ischemia: Non specific changes





- Labs


Labs: 


 Laboratory Tests











  05/29/18 05/29/18 05/29/18





  10:06 10:06 10:06


 


WBC  10.7  


 


RBC  3.34 L  


 


Hgb  10.4 L  


 


Hct  31.6 L  


 


MCV  94.7  


 


MCH  31.1 H  


 


MCHC  32.8  


 


RDW  15.3 H  


 


Plt Count  348  


 


MPV  6.6 L  


 


Neut #  8.9 H  


 


Lymph #  1.2 L  


 


Mono #  0.5  


 


Eos #  0.0  


 


Baso #  0.0  


 


Absolute Nucleated RBC  0.00  


 


Nucleated RBC %  0.0  


 


Whole Blood INR   


 


Sodium   136 


 


Potassium   4.4 


 


Chloride   99 L 


 


Carbon Dioxide   28 


 


Anion Gap   9.0 


 


BUN   20 


 


Creatinine   0.9 


 


Estimated GFR (MDRD)   59 L 


 


Glucose   128 H 


 


Lactic Acid   


 


Calcium   8.2 L 


 


Troponin I    < 0.04


 


B-Natriuretic Peptide   














  05/29/18 05/29/18 05/29/18





  10:06 10:08 14:36


 


WBC   


 


RBC   


 


Hgb   


 


Hct   


 


MCV   


 


MCH   


 


MCHC   


 


RDW   


 


Plt Count   


 


MPV   


 


Neut #   


 


Lymph #   


 


Mono #   


 


Eos #   


 


Baso #   


 


Absolute Nucleated RBC   


 


Nucleated RBC %   


 


Whole Blood INR    1.9 H


 


Sodium   


 


Potassium   


 


Chloride   


 


Carbon Dioxide   


 


Anion Gap   


 


BUN   


 


Creatinine   


 


Estimated GFR (MDRD)   


 


Glucose   


 


Lactic Acid  1.1  


 


Calcium   


 


Troponin I   


 


B-Natriuretic Peptide   465 H 














- Rads (name of study)


  ** CXR


Radiology: See rad report (c/w RAD, no CHF or pna)





PD MEDICAL DECISION MAKING





- ED course


ED course: 





per [facility] paperwork POLST full code





doing better with nebs


mildly elev BNP but no CHF on CXR and neg trop


no pna either


on couamdin so PE unlikely


seems to be COPD exacerbation


given steroids and nebs


still very SOA and now tachycardic


does not normally req O2 - now sat 90% on RA


will admit for steroids and nebs etc





paged hospitalist at 1315


spoke to hospitalist at 1500


pt and family updated











Departure





- Departure


Disposition: ED Place in Observation


Clinical Impression: 


 COPD exacerbation





Discharge Date/Time: 05/29/18 15:46

## 2018-05-30 LAB
ALBUMIN DIAFP-MCNC: 3.5 G/DL (ref 3.2–5.5)
ALBUMIN/GLOB SERPL: 0.8 {RATIO} (ref 1–2.2)
ALP SERPL-CCNC: 116 IU/L (ref 42–121)
ALT SERPL W P-5'-P-CCNC: 19 IU/L (ref 10–60)
ANION GAP SERPL CALCULATED.4IONS-SCNC: 10 MMOL/L (ref 6–13)
AST SERPL W P-5'-P-CCNC: 33 IU/L (ref 10–42)
BASOPHILS NFR BLD AUTO: 0 10^3/UL (ref 0–0.1)
BASOPHILS NFR BLD AUTO: 0.1 %
BILIRUB BLD-MCNC: 0.7 MG/DL (ref 0.2–1)
BUN SERPL-MCNC: 29 MG/DL (ref 6–20)
CALCIUM UR-MCNC: 8.5 MG/DL (ref 8.5–10.3)
CHLORIDE SERPL-SCNC: 95 MMOL/L (ref 101–111)
CO2 SERPL-SCNC: 25 MMOL/L (ref 21–32)
CREAT SERPLBLD-SCNC: 0.8 MG/DL (ref 0.4–1)
EOSINOPHIL # BLD AUTO: 0 10^3/UL (ref 0–0.7)
EOSINOPHIL NFR BLD AUTO: 0.1 %
ERYTHROCYTE [DISTWIDTH] IN BLOOD BY AUTOMATED COUNT: 14.9 % (ref 12–15)
GFRSERPLBLD MDRD-ARVRAT: 68 ML/MIN/{1.73_M2} (ref 89–?)
GLOBULIN SER-MCNC: 4.5 G/DL (ref 2.1–4.2)
GLUCOSE SERPL-MCNC: 122 MG/DL (ref 70–100)
HGB UR QL STRIP: 10 G/DL (ref 12–16)
INR PPP: 2.1 (ref 0.8–1.2)
LYMPHOCYTES # SPEC AUTO: 0.4 10^3/UL (ref 1.5–3.5)
LYMPHOCYTES NFR BLD AUTO: 3.7 %
MAGNESIUM SERPL-MCNC: 2.3 MG/DL (ref 1.7–2.8)
MCH RBC QN AUTO: 31.1 PG (ref 27–31)
MCHC RBC AUTO-ENTMCNC: 32.8 G/DL (ref 32–36)
MCV RBC AUTO: 94.9 FL (ref 81–99)
MONOCYTES # BLD AUTO: 0.2 10^3/UL (ref 0–1)
MONOCYTES NFR BLD AUTO: 1.6 %
NEUTROPHILS # BLD AUTO: 10.1 10^3/UL (ref 1.5–6.6)
NEUTROPHILS # SNV AUTO: 10.6 X10^3/UL (ref 4.8–10.8)
NEUTROPHILS NFR BLD AUTO: 94.5 %
PDW BLD AUTO: 6.9 FL (ref 7.9–10.8)
PLATELET # BLD: 334 10^3/UL (ref 130–450)
PROT SPEC-MCNC: 8 G/DL (ref 6.7–8.2)
PROTHROM ACT/NOR PPP: 22.9 SECS (ref 9.9–12.6)
RBC MAR: 3.22 10^6/UL (ref 4.2–5.4)
SODIUM SERPLBLD-SCNC: 130 MMOL/L (ref 135–145)

## 2018-05-30 RX ADMIN — MORPHINE SULFATE PRN MG: 10 SOLUTION ORAL at 08:57

## 2018-05-30 RX ADMIN — SODIUM CHLORIDE, PRESERVATIVE FREE SCH ML: 5 INJECTION INTRAVENOUS at 00:11

## 2018-05-30 RX ADMIN — IPRATROPIUM BROMIDE SCH MG: 0.5 SOLUTION RESPIRATORY (INHALATION) at 07:15

## 2018-05-30 RX ADMIN — CHOLECALCIFEROL TAB 10 MCG (400 UNIT) SCH UNIT: 10 TAB at 08:57

## 2018-05-30 RX ADMIN — IPRATROPIUM BROMIDE SCH MG: 0.5 SOLUTION RESPIRATORY (INHALATION) at 20:44

## 2018-05-30 RX ADMIN — FUROSEMIDE SCH MG: 20 TABLET ORAL at 08:57

## 2018-05-30 RX ADMIN — ATORVASTATIN CALCIUM SCH MG: 10 TABLET, FILM COATED ORAL at 21:38

## 2018-05-30 RX ADMIN — LEVALBUTEROL SCH MG: 1.25 SOLUTION RESPIRATORY (INHALATION) at 20:45

## 2018-05-30 RX ADMIN — CALCIUM CARBONATE (ANTACID) CHEW TAB 500 MG SCH MG: 500 CHEW TAB at 08:56

## 2018-05-30 RX ADMIN — ACETAMINOPHEN SCH MG: 325 TABLET ORAL at 05:57

## 2018-05-30 RX ADMIN — POLYETHYLENE GLYCOL 3350 SCH GM: 17 POWDER, FOR SOLUTION ORAL at 08:56

## 2018-05-30 RX ADMIN — ACETAMINOPHEN SCH MG: 325 TABLET ORAL at 00:12

## 2018-05-30 RX ADMIN — ACETAMINOPHEN SCH MG: 325 TABLET ORAL at 17:04

## 2018-05-30 RX ADMIN — SODIUM CHLORIDE, PRESERVATIVE FREE PRN ML: 5 INJECTION INTRAVENOUS at 13:52

## 2018-05-30 RX ADMIN — AZITHROMYCIN MONOHYDRATE SCH MG: 250 TABLET ORAL at 11:54

## 2018-05-30 RX ADMIN — LEVALBUTEROL PRN MG: 1.25 SOLUTION RESPIRATORY (INHALATION) at 10:54

## 2018-05-30 RX ADMIN — METHYLPREDNISOLONE SODIUM SUCCINATE SCH MG: 40 INJECTION, POWDER, FOR SOLUTION INTRAMUSCULAR; INTRAVENOUS at 05:57

## 2018-05-30 RX ADMIN — ACETAMINOPHEN SCH MG: 325 TABLET ORAL at 23:49

## 2018-05-30 RX ADMIN — SODIUM CHLORIDE, PRESERVATIVE FREE PRN ML: 5 INJECTION INTRAVENOUS at 05:57

## 2018-05-30 RX ADMIN — THERA TABS SCH TAB: TAB at 08:57

## 2018-05-30 RX ADMIN — SODIUM CHLORIDE, PRESERVATIVE FREE SCH ML: 5 INJECTION INTRAVENOUS at 17:04

## 2018-05-30 RX ADMIN — LEVALBUTEROL SCH MG: 1.25 SOLUTION RESPIRATORY (INHALATION) at 07:14

## 2018-05-30 RX ADMIN — SODIUM CHLORIDE, PRESERVATIVE FREE SCH ML: 5 INJECTION INTRAVENOUS at 21:38

## 2018-05-30 RX ADMIN — SODIUM CHLORIDE, PRESERVATIVE FREE SCH: 5 INJECTION INTRAVENOUS at 08:57

## 2018-05-30 RX ADMIN — LEVOTHYROXINE SODIUM SCH MCG: 0.03 TABLET ORAL at 06:04

## 2018-05-30 RX ADMIN — ACETAMINOPHEN SCH MG: 325 TABLET ORAL at 11:54

## 2018-05-30 RX ADMIN — MORPHINE SULFATE PRN MG: 10 SOLUTION ORAL at 19:25

## 2018-05-30 RX ADMIN — METHYLPREDNISOLONE SODIUM SUCCINATE SCH MG: 40 INJECTION, POWDER, FOR SOLUTION INTRAMUSCULAR; INTRAVENOUS at 21:38

## 2018-05-30 RX ADMIN — METHYLPREDNISOLONE SODIUM SUCCINATE SCH MG: 40 INJECTION, POWDER, FOR SOLUTION INTRAMUSCULAR; INTRAVENOUS at 13:52

## 2018-05-30 RX ADMIN — WARFARIN SODIUM SCH MG: 1 TABLET ORAL at 17:04

## 2018-05-30 NOTE — XRAY REPORT
EXAM: 

CHEST RADIOGRAPHY

 

EXAM DATE: 5/30/2018 05:02 PM.

 

CLINICAL HISTORY: Shortness of breath and wheezing.

 

COMPARISON: Chest 05/29/2018.

 

TECHNIQUE: 1 view.

 

FINDINGS: Mild diffuse interstitial pulmonary edema appears similar to the prior. Aortic valve Calcif
ication. Mitral annular calcification. No pleural effusion or pneumothorax.

 

IMPRESSION: Mild diffuse interstitial pulmonary edema appears similar to the prior.

 

[facility]

Referring Provider Line: [phone]

 

SITE ID: [id number]

## 2018-05-30 NOTE — PROVIDER PROGRESS NOTE
Subjective





- Prog Note Date


Prog Note Date: 05/30/18





- Subjective


Pt reports feeling: No change


Subjective: 


pt is still wheezing. Pt is some confusion. pt pulled out her IV by herself.








Current Medications





- Current Medications


Current Medications: 





Active Medications





Acetaminophen (Tylenol)  650 mg PO Q4HR PRN


   PRN Reason: Pain 1 to 4


Acetaminophen (Tylenol)  650 mg PO Q6H [facility]


   Last Admin: 05/30/18 11:54 Dose:  650 mg


Atorvastatin Calcium (Lipitor)  10 mg PO QPM [facility]


   Last Admin: 05/29/18 21:04 Dose:  10 mg


Azithromycin (Zithromax)  250 mg PO DAILY [facility]


   Last Admin: 05/30/18 11:54 Dose:  250 mg


Calcium Carbonate/Glycine (Tums)  500 mg PO DAILY [facility]


   Last Admin: 05/30/18 08:56 Dose:  500 mg


Cholecalciferol (Vitamin D3)  200 unit PO DAILY [facility]


   Last Admin: 05/30/18 08:57 Dose:  200 unit


Cyclobenzaprine HCl (Flexeril)  10 mg PO TID PRN


   PRN Reason: COCCYX FRACTURE


Diltiazem HCl (Cardizem Cd)  240 mg PO DAILY [facility]


   Last Admin: 05/30/18 08:57 Dose:  240 mg


Furosemide (Lasix)  20 mg PO DAILY [facility]


   Last Admin: 05/30/18 08:57 Dose:  20 mg


Guaifenesin (Robitussin Dm)  10 ml PO Q4H PRN


   PRN Reason: Cough


Sodium Chloride (Normal Saline 0.9%)  1,000 mls @ 50 mls/hr IV .Q20H [facility]


   Last Admin: 05/30/18 14:11 Dose:  50 mls/hr


Ipratropium Bromide (Atrovent)  0.5 mg INH RTQID PRN


   PRN Reason: Wheezing


   Last Admin: 05/29/18 18:23 Dose:  0.5 mg


Ipratropium Bromide (Atrovent)  0.5 mg INH RTBID [facility]


   Last Admin: 05/30/18 07:15 Dose:  0.5 mg


Levalbuterol HCl (Xopenex)  1.25 mg INH RTQID PRN


   PRN Reason: SHORTNESS OF AIR/WHEEZING


   Last Admin: 05/30/18 10:54 Dose:  1.25 mg


Levalbuterol HCl (Xopenex)  1.25 mg INH RTBID [facility]


   Last Admin: 05/30/18 07:14 Dose:  1.25 mg


Levothyroxine Sodium (Synthroid)  25 mcg PO QDAC [facility]


   Last Admin: 05/30/18 06:04 Dose:  25 mcg


Methylprednisolone (Solu-Medrol (40mg Vial))  60 mg IVP TID [facility]


   Last Admin: 05/30/18 13:52 Dose:  60 mg


Morphine Sulfate (Roxanol)  10 mg PO Q2HR PRN


   PRN Reason: PAIN


   Last Admin: 05/30/18 08:57 Dose:  10 mg


Multivitamins (Theragran)  1 tab PO DAILYWM [facility]


   Last Admin: 05/30/18 08:57 Dose:  1 tab


Ondansetron HCl (Zofran Inj)  4 mg IVP Q6HR PRN


   PRN Reason: Nausea / Vomiting


Ondansetron HCl (Zofran Odt)  4 mg TL Q6HR PRN


   PRN Reason: Nausea / Vomiting


Polyethylene Glycol (Miralax)  17 gm PO DAILY [facility]


   Last Admin: 05/30/18 08:56 Dose:  17 gm


Sodium Chloride (Normal Saline Flush 0.9%)  10 ml IVP PRN PRN


   PRN Reason: AS NEEDED PER PROVIDER ORDERS


   Last Admin: 05/30/18 13:52 Dose:  10 ml


Sodium Chloride (Normal Saline Flush 0.9%)  10 ml IVP 0100,0900,1700 [facility]


   Last Admin: 05/30/18 08:57 Dose:  Not Given


Tramadol HCl (Ultram)  50 mg PO Q6H PRN


   PRN Reason: PAIN


Warfarin Sodium (Coumadin)  1.5 mg PO 1700 [facility]


   Last Admin: 05/29/18 17:41 Dose:  1.5 mg





 





Diltiazem HCl [Diltiazem 24Hr ER] 240 mg PO DAILY 08/14/14 


Pravastatin Sodium 10 mg PO QPM 05/17/18 


Acetaminophen 650 mg PO Q4H PRN 05/29/18 


Acetaminophen [Tylenol] 650 mg PO Q6H 05/29/18 


Calcium Carbonate/Vitamin D3 [Calcium 500-Vit D3 200 Tablet] 1 tab PO DAILY 05/ 29/18 


Cyclobenzaprine HCl 10 mg PO TID PRN 05/29/18 


Furosemide [Lasix] 20 mg PO DAILY 05/29/18 


Levalbuterol HCl 1.25 mg INH BID 05/29/18 


Levothyroxine [Synthroid] 25 mcg PO QDAC 05/29/18 


Tramadol HCl 50 mg PO Q6H PRN 05/29/18 


Warfarin [Coumadin] 1.5 mg PO 1700 05/29/18 


guaiFENesin/DEXTROMETHORPHAN [Robitussin Dm] 10 ml PO Q4H PRN 05/29/18 











Objective





- Vital Signs/Intake & Output


Reviewed Vital Signs: Yes


Vital Signs: 


 Vital Signs x48h











  Temp Pulse Pulse Resp BP Pulse Ox


 


 05/30/18 13:00  36.8 C   113 H  18  137/92 H  97


 


 05/30/18 10:55   100   24  











Intake & Output: 


 Intake & Output











 05/27/18 05/28/18 05/29/18 05/30/18





 23:59 23:59 23:59 23:59


 


Intake Total    240


 


Balance    240














- Objective


General Appearance: positive: Alert, Mild distress.  negative: Lethargic


Eyes Bilateral: positive: Normal inspection, PERRL, No lid inflammation, 

Conjunctivae nml


ENT: positive: ENT inspection nml, Pharynx nml.  negative: Purulent nasal 

drainage, Pharyngeal erythema, Oral lesions


Neck: positive: Nml inspection, Thyroid nml, No JVD, Trachea midline.  negative

: Thyromegaly, Lymphadenopathy (R), Lymphadenopathy (L), Stiff neck, Swelling/

bruising, Tracheal deviation


Respiratory: positive: Chest non-tender, No respiratory distress, Wheezes.  

negative: Rales, Rhonchi


Cardiovascular: positive: Regular rate & rhythm, No murmur, No gallop.  negative

: Irregularly irregular, Extrasystoles, Tachycardia, Bradycardia, Systolic 

murmur, Diastolic murmur


Peripheral Pulses: 2+ Radial (R), 2+ Radial (L), 2+ Dorsalis pedis (R), 2+ 

Dorsalis pedis (L)


Abdomen: positive: Non-tender, No organomegaly, Nml bowel sounds, No 

distention.  negative: Tenderness, Guarding, Rebound


Back: positive: Nml inspection.  negative: CVA tenderness (R), CVA tenderness (L

)


Skin: positive: Color nml, No rash, Warm, Dry.  negative: Cyanosis, Diaphoresis

, Pallor


Extremities: positive: Non-tender.  negative: Calf tenderness, Daiana's sign/

cords


Neurologic/Psychiatric: positive: Weakness.  negative: Sensory loss, Facial 

droop, Slurred/abnml speech





- Lab Results


Fish Bones: 


 05/30/18 05:47





 05/30/18 05:47





ABX Reporting


Has patient been on IV antibiotics over the past 48 hours?: No





Assessment/Plan





- Problem List


(1) Wheezing


Impression: 





Conclusion/Plan: 


not better, still wheezing 


increase Solu-metro to 60mg tid


continue breath treatment


O2 supplement as needed








pt with hx of COPD, it seem to be caused by COPD exacerbation


solu-metro


Xopenex


Ipro


O2 supplement as needed


lab and vital monitor








(2) Afib


Conclusion/Plan: 


stable, around 


continue tele, vital monitor





100-110 HR, continue Cardizem


continue Coumadin


check PT/INR








(3) History of CVA (cerebrovascular accident)


Conclusion/Plan: 


stable, continue home meds





it appears no new focus neurological deficit


continue home meds


continue support











(5) Hx of encephalopathy


Conclusion/Plan: 


pt present some confusion as his history


will check ammonia


support, re-oriented


neuro check








pt is with hx of encephalopathy, present some confusion


check ammonia, B12, folic acid, TSH, follow up


neuro check








(6) Hypothyroidism


Conclusion/Plan: 


TSH is lower, pt take 25 mcg levoth


recheck T4/3, follow up





check TSH, follow up








(7) hyponatremia


Na 130 now


start 50 NS IVF


daily lab monitor, vital monitor

## 2018-05-30 NOTE — XRAY PRELIMINARY REPORT
Accession: [id number]

Exam: XR CHEST 1 VIEW X-RAY

 

IMPRESSION: Mild diffuse interstitial pulmonary edema appears similar to the prior.

 

[facility]

 

SITE ID: [id number]

## 2018-05-31 LAB
ALBUMIN DIAFP-MCNC: 3.4 G/DL (ref 3.2–5.5)
ALBUMIN/GLOB SERPL: 0.9 {RATIO} (ref 1–2.2)
ALP SERPL-CCNC: 104 IU/L (ref 42–121)
ALT SERPL W P-5'-P-CCNC: 19 IU/L (ref 10–60)
ANION GAP SERPL CALCULATED.4IONS-SCNC: 11 MMOL/L (ref 6–13)
AST SERPL W P-5'-P-CCNC: 38 IU/L (ref 10–42)
BASOPHILS NFR BLD AUTO: 0 %
BASOPHILS NFR BLD AUTO: 0 10^3/UL (ref 0–0.1)
BILIRUB BLD-MCNC: 0.7 MG/DL (ref 0.2–1)
BUN SERPL-MCNC: 43 MG/DL (ref 6–20)
CALCIUM UR-MCNC: 8.3 MG/DL (ref 8.5–10.3)
CHLORIDE SERPL-SCNC: 95 MMOL/L (ref 101–111)
CO2 SERPL-SCNC: 25 MMOL/L (ref 21–32)
CREAT SERPLBLD-SCNC: 1.3 MG/DL (ref 0.4–1)
EOSINOPHIL # BLD AUTO: 0 10^3/UL (ref 0–0.7)
EOSINOPHIL NFR BLD AUTO: 0 %
ERYTHROCYTE [DISTWIDTH] IN BLOOD BY AUTOMATED COUNT: 15.1 % (ref 12–15)
GFRSERPLBLD MDRD-ARVRAT: 39 ML/MIN/{1.73_M2} (ref 89–?)
GLOBULIN SER-MCNC: 4 G/DL (ref 2.1–4.2)
GLUCOSE SERPL-MCNC: 132 MG/DL (ref 70–100)
HGB UR QL STRIP: 9.5 G/DL (ref 12–16)
INR PPP: 3.6 (ref 0.8–1.2)
LYMPHOCYTES # SPEC AUTO: 0.5 10^3/UL (ref 1.5–3.5)
LYMPHOCYTES NFR BLD AUTO: 4.1 %
MCH RBC QN AUTO: 31.1 PG (ref 27–31)
MCHC RBC AUTO-ENTMCNC: 32.9 G/DL (ref 32–36)
MCV RBC AUTO: 94.6 FL (ref 81–99)
MONOCYTES # BLD AUTO: 0.4 10^3/UL (ref 0–1)
MONOCYTES NFR BLD AUTO: 3.4 %
NEUTROPHILS # BLD AUTO: 10.6 10^3/UL (ref 1.5–6.6)
NEUTROPHILS # SNV AUTO: 11.5 X10^3/UL (ref 4.8–10.8)
NEUTROPHILS NFR BLD AUTO: 92.5 %
PDW BLD AUTO: 7.3 FL (ref 7.9–10.8)
PLATELET # BLD: 354 10^3/UL (ref 130–450)
PROT SPEC-MCNC: 7.4 G/DL (ref 6.7–8.2)
PROTHROM ACT/NOR PPP: 39.1 SECS (ref 9.9–12.6)
RBC MAR: 3.06 10^6/UL (ref 4.2–5.4)
SODIUM SERPLBLD-SCNC: 131 MMOL/L (ref 135–145)

## 2018-05-31 RX ADMIN — ACETAMINOPHEN SCH MG: 325 TABLET ORAL at 10:16

## 2018-05-31 RX ADMIN — LEVALBUTEROL SCH MG: 1.25 SOLUTION RESPIRATORY (INHALATION) at 08:12

## 2018-05-31 RX ADMIN — IPRATROPIUM BROMIDE SCH MG: 0.5 SOLUTION RESPIRATORY (INHALATION) at 17:53

## 2018-05-31 RX ADMIN — CHOLECALCIFEROL TAB 10 MCG (400 UNIT) SCH UNIT: 10 TAB at 10:12

## 2018-05-31 RX ADMIN — CALCIUM CARBONATE (ANTACID) CHEW TAB 500 MG SCH MG: 500 CHEW TAB at 10:15

## 2018-05-31 RX ADMIN — SODIUM CHLORIDE, PRESERVATIVE FREE SCH ML: 5 INJECTION INTRAVENOUS at 21:11

## 2018-05-31 RX ADMIN — FUROSEMIDE SCH MG: 20 TABLET ORAL at 10:13

## 2018-05-31 RX ADMIN — METHYLPREDNISOLONE SODIUM SUCCINATE SCH MG: 40 INJECTION, POWDER, FOR SOLUTION INTRAMUSCULAR; INTRAVENOUS at 14:29

## 2018-05-31 RX ADMIN — SODIUM CHLORIDE, PRESERVATIVE FREE PRN ML: 5 INJECTION INTRAVENOUS at 05:48

## 2018-05-31 RX ADMIN — FUROSEMIDE SCH MG: 20 TABLET ORAL at 15:04

## 2018-05-31 RX ADMIN — ACETAMINOPHEN SCH MG: 325 TABLET ORAL at 17:07

## 2018-05-31 RX ADMIN — LEVALBUTEROL SCH MG: 1.25 SOLUTION RESPIRATORY (INHALATION) at 17:53

## 2018-05-31 RX ADMIN — METHYLPREDNISOLONE SODIUM SUCCINATE SCH MG: 40 INJECTION, POWDER, FOR SOLUTION INTRAMUSCULAR; INTRAVENOUS at 05:49

## 2018-05-31 RX ADMIN — IPRATROPIUM BROMIDE SCH MG: 0.5 SOLUTION RESPIRATORY (INHALATION) at 08:12

## 2018-05-31 RX ADMIN — ATORVASTATIN CALCIUM SCH MG: 10 TABLET, FILM COATED ORAL at 21:10

## 2018-05-31 RX ADMIN — AZITHROMYCIN MONOHYDRATE SCH MG: 250 TABLET ORAL at 10:15

## 2018-05-31 RX ADMIN — ACETAMINOPHEN SCH MG: 325 TABLET ORAL at 05:47

## 2018-05-31 RX ADMIN — LEVOTHYROXINE SODIUM SCH MCG: 0.03 TABLET ORAL at 10:14

## 2018-05-31 RX ADMIN — SODIUM CHLORIDE, PRESERVATIVE FREE SCH: 5 INJECTION INTRAVENOUS at 09:15

## 2018-05-31 RX ADMIN — POLYETHYLENE GLYCOL 3350 SCH GM: 17 POWDER, FOR SOLUTION ORAL at 10:15

## 2018-05-31 RX ADMIN — METHYLPREDNISOLONE SODIUM SUCCINATE SCH MG: 40 INJECTION, POWDER, FOR SOLUTION INTRAMUSCULAR; INTRAVENOUS at 21:11

## 2018-05-31 RX ADMIN — THERA TABS SCH TAB: TAB at 10:15

## 2018-06-01 LAB
ALBUMIN DIAFP-MCNC: 3.2 G/DL (ref 3.2–5.5)
ALBUMIN/GLOB SERPL: 0.8 {RATIO} (ref 1–2.2)
ALP SERPL-CCNC: 89 IU/L (ref 42–121)
ALT SERPL W P-5'-P-CCNC: 18 IU/L (ref 10–60)
ANION GAP SERPL CALCULATED.4IONS-SCNC: 10 MMOL/L (ref 6–13)
AST SERPL W P-5'-P-CCNC: 29 IU/L (ref 10–42)
BASOPHILS NFR BLD AUTO: 0 10^3/UL (ref 0–0.1)
BASOPHILS NFR BLD AUTO: 0.1 %
BILIRUB BLD-MCNC: 0.6 MG/DL (ref 0.2–1)
BUN SERPL-MCNC: 58 MG/DL (ref 6–20)
CALCIUM UR-MCNC: 7.8 MG/DL (ref 8.5–10.3)
CHLORIDE SERPL-SCNC: 95 MMOL/L (ref 101–111)
CO2 SERPL-SCNC: 25 MMOL/L (ref 21–32)
CREAT SERPLBLD-SCNC: 1.5 MG/DL (ref 0.4–1)
EOSINOPHIL # BLD AUTO: 0 10^3/UL (ref 0–0.7)
EOSINOPHIL NFR BLD AUTO: 0 %
ERYTHROCYTE [DISTWIDTH] IN BLOOD BY AUTOMATED COUNT: 15.1 % (ref 12–15)
GFRSERPLBLD MDRD-ARVRAT: 33 ML/MIN/{1.73_M2} (ref 89–?)
GLOBULIN SER-MCNC: 3.8 G/DL (ref 2.1–4.2)
GLUCOSE SERPL-MCNC: 152 MG/DL (ref 70–100)
HGB UR QL STRIP: 9.2 G/DL (ref 12–16)
INR PPP: 6.9 (ref 0.8–1.2)
LYMPHOCYTES # SPEC AUTO: 0.4 10^3/UL (ref 1.5–3.5)
LYMPHOCYTES NFR BLD AUTO: 4.1 %
MCH RBC QN AUTO: 31.1 PG (ref 27–31)
MCHC RBC AUTO-ENTMCNC: 33 G/DL (ref 32–36)
MCV RBC AUTO: 94.3 FL (ref 81–99)
MONOCYTES # BLD AUTO: 0.3 10^3/UL (ref 0–1)
MONOCYTES NFR BLD AUTO: 3 %
NEUTROPHILS # BLD AUTO: 8.6 10^3/UL (ref 1.5–6.6)
NEUTROPHILS # SNV AUTO: 9.3 X10^3/UL (ref 4.8–10.8)
NEUTROPHILS NFR BLD AUTO: 92.8 %
PDW BLD AUTO: 7.4 FL (ref 7.9–10.8)
PLATELET # BLD: 324 10^3/UL (ref 130–450)
PROT SPEC-MCNC: 7 G/DL (ref 6.7–8.2)
PROTHROM ACT/NOR PPP: 72.5 SECS (ref 9.9–12.6)
RBC MAR: 2.97 10^6/UL (ref 4.2–5.4)
SODIUM SERPLBLD-SCNC: 130 MMOL/L (ref 135–145)

## 2018-06-01 RX ADMIN — CALCIUM CARBONATE (ANTACID) CHEW TAB 500 MG SCH MG: 500 CHEW TAB at 08:24

## 2018-06-01 RX ADMIN — SODIUM CHLORIDE, PRESERVATIVE FREE SCH ML: 5 INJECTION INTRAVENOUS at 01:12

## 2018-06-01 RX ADMIN — ACETAMINOPHEN SCH MG: 325 TABLET ORAL at 23:08

## 2018-06-01 RX ADMIN — LEVALBUTEROL SCH MG: 1.25 SOLUTION RESPIRATORY (INHALATION) at 20:44

## 2018-06-01 RX ADMIN — ACETAMINOPHEN SCH MG: 325 TABLET ORAL at 10:59

## 2018-06-01 RX ADMIN — ACETAMINOPHEN SCH MG: 325 TABLET ORAL at 04:43

## 2018-06-01 RX ADMIN — AZITHROMYCIN MONOHYDRATE SCH MG: 250 TABLET ORAL at 08:24

## 2018-06-01 RX ADMIN — IPRATROPIUM BROMIDE SCH MG: 0.5 SOLUTION RESPIRATORY (INHALATION) at 07:45

## 2018-06-01 RX ADMIN — SODIUM CHLORIDE, PRESERVATIVE FREE SCH ML: 5 INJECTION INTRAVENOUS at 08:25

## 2018-06-01 RX ADMIN — ACETAMINOPHEN SCH MG: 325 TABLET ORAL at 01:02

## 2018-06-01 RX ADMIN — CHOLECALCIFEROL TAB 10 MCG (400 UNIT) SCH UNIT: 10 TAB at 08:24

## 2018-06-01 RX ADMIN — POLYETHYLENE GLYCOL 3350 SCH: 17 POWDER, FOR SOLUTION ORAL at 08:43

## 2018-06-01 RX ADMIN — IPRATROPIUM BROMIDE SCH MG: 0.5 SOLUTION RESPIRATORY (INHALATION) at 20:44

## 2018-06-01 RX ADMIN — LEVOTHYROXINE SODIUM SCH MCG: 0.03 TABLET ORAL at 06:44

## 2018-06-01 RX ADMIN — IPRATROPIUM BROMIDE PRN MG: 0.5 SOLUTION RESPIRATORY (INHALATION) at 14:25

## 2018-06-01 RX ADMIN — METHYLPREDNISOLONE SODIUM SUCCINATE SCH MG: 40 INJECTION, POWDER, FOR SOLUTION INTRAMUSCULAR; INTRAVENOUS at 04:44

## 2018-06-01 RX ADMIN — ACETAMINOPHEN SCH MG: 325 TABLET ORAL at 18:12

## 2018-06-01 RX ADMIN — LEVALBUTEROL SCH MG: 1.25 SOLUTION RESPIRATORY (INHALATION) at 07:45

## 2018-06-01 RX ADMIN — THERA TABS SCH TAB: TAB at 08:24

## 2018-06-01 RX ADMIN — SODIUM CHLORIDE, PRESERVATIVE FREE SCH ML: 5 INJECTION INTRAVENOUS at 18:12

## 2018-06-01 RX ADMIN — ATORVASTATIN CALCIUM SCH MG: 10 TABLET, FILM COATED ORAL at 21:28

## 2018-06-01 RX ADMIN — LEVALBUTEROL PRN MG: 1.25 SOLUTION RESPIRATORY (INHALATION) at 04:41

## 2018-06-01 RX ADMIN — FUROSEMIDE SCH MG: 20 TABLET ORAL at 06:44

## 2018-06-01 RX ADMIN — LEVALBUTEROL PRN MG: 1.25 SOLUTION RESPIRATORY (INHALATION) at 14:24

## 2018-06-01 RX ADMIN — SODIUM CHLORIDE, PRESERVATIVE FREE PRN ML: 5 INJECTION INTRAVENOUS at 04:45

## 2018-06-01 NOTE — PROVIDER PROGRESS NOTE
Subjective





- Prog Note Date


Prog Note Date: 06/01/18





- Subjective


Pt reports feeling: Improved


Subjective: 


pt's wheezing is better controlled but still present mild to moderate wheezing 

at central anterior and posterior . pt feels better. No fever, chill, CP are 

reported.


pt's INR increase to 6.7 with Coumadin








Current Medications





- Current Medications


Current Medications: 





Active Medications





Acetaminophen (Tylenol)  650 mg PO Q4HR PRN


   PRN Reason: Pain 1 to 4


Acetaminophen (Tylenol)  650 mg PO Q6H [facility]


   Last Admin: 06/01/18 10:59 Dose:  650 mg


Atorvastatin Calcium (Lipitor)  10 mg PO QPM [facility]


   Last Admin: 05/31/18 21:10 Dose:  10 mg


Azithromycin (Zithromax)  250 mg PO DAILY [facility]


   Last Admin: 06/01/18 08:24 Dose:  250 mg


Calcium Carbonate/Glycine (Tums)  500 mg PO DAILY [facility]


   Last Admin: 06/01/18 08:24 Dose:  500 mg


Cholecalciferol (Vitamin D3)  200 unit PO DAILY [facility]


   Last Admin: 06/01/18 08:24 Dose:  200 unit


Cyclobenzaprine HCl (Flexeril)  10 mg PO TID PRN


   PRN Reason: COCCYX FRACTURE


Diltiazem HCl (Cardizem Cd)  240 mg PO DAILY [facility]


   Last Admin: 06/01/18 08:24 Dose:  240 mg


Furosemide (Lasix)  20 mg PO DAILY [facility]


Guaifenesin (Robitussin Dm)  10 ml PO Q4H PRN


   PRN Reason: Cough


   Last Admin: 06/01/18 04:45 Dose:  10 ml


Ipratropium Bromide (Atrovent)  0.5 mg INH RTQID PRN


   PRN Reason: Wheezing


   Last Admin: 06/01/18 14:25 Dose:  0.5 mg


Ipratropium Bromide (Atrovent)  0.5 mg INH RTBID [facility]


   Last Admin: 05/31/18 17:53 Dose:  0.5 mg


Levalbuterol HCl (Xopenex)  1.25 mg INH RTQID PRN


   PRN Reason: SHORTNESS OF AIR/WHEEZING


   Last Admin: 06/01/18 14:24 Dose:  1.25 mg


Levalbuterol HCl (Xopenex)  1.25 mg INH RTBID [facility]


   Last Admin: 05/31/18 17:53 Dose:  1.25 mg


Levothyroxine Sodium (Synthroid)  25 mcg PO QDAC [facility]


   Last Admin: 06/01/18 06:44 Dose:  25 mcg


Mineral Oil (Cavilon)  1 applic TOP BID PRN


   PRN Reason: Skin


Morphine Sulfate (Roxanol)  10 mg PO Q2HR PRN


   PRN Reason: PAIN


   Last Admin: 05/30/18 19:25 Dose:  10 mg


Multivitamins (Theragran)  1 tab PO DAILYWM [facility]


   Last Admin: 06/01/18 08:24 Dose:  1 tab


Ondansetron HCl (Zofran Inj)  4 mg IVP Q6HR PRN


   PRN Reason: Nausea / Vomiting


Ondansetron HCl (Zofran Odt)  4 mg TL Q6HR PRN


   PRN Reason: Nausea / Vomiting


Polyethylene Glycol (Miralax)  17 gm PO DAILY [facility]


   Last Admin: 06/01/18 08:43 Dose:  Not Given


Prednisone (Deltasone)  30 mg PO DAILYWM [facility]


   Last Admin: 06/01/18 14:09 Dose:  30 mg


Sodium Chloride (Normal Saline Flush 0.9%)  10 ml IVP PRN PRN


   PRN Reason: AS NEEDED PER PROVIDER ORDERS


   Last Admin: 06/01/18 04:45 Dose:  10 ml


Sodium Chloride (Normal Saline Flush 0.9%)  10 ml IVP 0100,0900,1700 [facility]


   Last Admin: 06/01/18 08:25 Dose:  10 ml


Tramadol HCl (Ultram)  50 mg PO Q6H PRN


   PRN Reason: PAIN





 





Diltiazem HCl [Diltiazem 24Hr ER] 240 mg PO DAILY 08/14/14 


Pravastatin Sodium 10 mg PO QPM 05/17/18 


Acetaminophen 650 mg PO Q4H PRN 05/29/18 


Acetaminophen [Tylenol] 650 mg PO Q6H 05/29/18 


Calcium Carbonate/Vitamin D3 [Calcium 500-Vit D3 200 Tablet] 1 tab PO DAILY 05/ 29/18 


Cyclobenzaprine HCl 10 mg PO TID PRN 05/29/18 


Furosemide [Lasix] 20 mg PO DAILY 05/29/18 


Levalbuterol HCl 1.25 mg INH BID 05/29/18 


Levothyroxine [Synthroid] 25 mcg PO QDAC 05/29/18 


Tramadol HCl 50 mg PO Q6H PRN 05/29/18 


Warfarin [Coumadin] 1.5 mg PO 1700 05/29/18 


guaiFENesin/DEXTROMETHORPHAN [Robitussin Dm] 10 ml PO Q4H PRN 05/29/18 











Objective





- Vital Signs/Intake & Output


Reviewed Vital Signs: Yes


Vital Signs: 


 Vital Signs x48h











  Temp Pulse Pulse Pulse Pulse Pulse Resp


 


 06/01/18 15:59  36.8 C    83    18


 


 06/01/18 14:26   74      15


 


 06/01/18 12:46  36.8 C    65    18


 


 06/01/18 10:25    90   117 H  79 


 


 06/01/18 10:24    90   117 H  79 














  BP BP BP BP Pulse Ox


 


 06/01/18 15:59   130/70    98


 


 06/01/18 14:26     


 


 06/01/18 12:46   116/51 L    95


 


 06/01/18 10:25  141/68 H   153/115 H  114/54 L 


 


 06/01/18 10:24  141/68 H   153/115 H  114/54 L 











Intake & Output: 


 Intake & Output











 05/29/18 05/30/18 05/31/18 06/01/18





 23:59 23:59 23:59 23:59


 


Intake Total  536 1308.75 100


 


Balance  536 1308.75 100














- Objective


General Appearance: positive: No acute distress, Alert.  negative: Lethargic


Eyes Bilateral: positive: Normal inspection, PERRL, No lid inflammation, 

Conjunctivae nml


ENT: positive: ENT inspection nml, Pharynx nml, No signs of dehydration.  

negative: Purulent nasal drainage, Pharyngeal erythema, Oral lesions


Neck: positive: Nml inspection, Thyroid nml, No JVD, Trachea midline.  negative

: Thyromegaly, Lymphadenopathy (R), Lymphadenopathy (L), Stiff neck, Swelling/

bruising, Tracheal deviation


Respiratory: positive: Chest non-tender, No respiratory distress, Breath sounds 

nml.  negative: Wheezes


Cardiovascular: positive: Regular rate & rhythm, No murmur, No gallop.  negative

: Irregularly irregular, Extrasystoles, Tachycardia, Bradycardia, Systolic 

murmur, Diastolic murmur


Peripheral Pulses: 2+ Radial (R), 2+ Radial (L), 2+ Dorsalis pedis (R), 2+ 

Dorsalis pedis (L)


Abdomen: positive: Non-tender, No organomegaly, Nml bowel sounds, No 

distention.  negative: Tenderness, Guarding, Rebound


Back: positive: Nml inspection.  negative: CVA tenderness (R), CVA tenderness (L

)


Skin: positive: Color nml, No rash, Warm, Dry.  negative: Cyanosis, Diaphoresis

, Pallor


Extremities: positive: Non-tender.  negative: Calf tenderness, Joint swelling, 

Daiana's sign/cords


Neurologic/Psychiatric: positive: Slurred/abnml speech.  negative: Sensory loss

, Depressed mood/affect





- Lab Results


Fish Bones: 


 06/01/18 05:30





 06/01/18 05:30


Other Labs: 


 Lab Results x24hrs











  06/01/18 06/01/18 06/01/18 Range/Units





  05:30 05:30 05:30 


 


WBC    9.3  (4.8-10.8)  x10^3/uL


 


RBC    2.97 L  (4.20-5.40)  10^6/uL


 


Hgb    9.2 L  (12.0-16.0)  g/dL


 


Hct    28.0 L  (37.0-47.0)  %


 


MCV    94.3  (81.0-99.0)  fL


 


MCH    31.1 H  (27.0-31.0)  pg


 


MCHC    33.0  (32.0-36.0)  g/dL


 


RDW    15.1 H  (12.0-15.0)  %


 


Plt Count    324  (130-450)  10^3/uL


 


MPV    7.4 L  (7.9-10.8)  fL


 


Neut #    8.6 H  (1.5-6.6)  10^3/uL


 


Lymph #    0.4 L  (1.5-3.5)  10^3/uL


 


Mono #    0.3  (0.0-1.0)  10^3/uL


 


Eos #    0.0  (0.0-0.7)  10^3/uL


 


Baso #    0.0  (0.0-0.1)  10^3/uL


 


Absolute Nucleated RBC    0.00  x10^3/uL


 


Nucleated RBC %    0.0  /100WBC


 


PT     (9.9-12.6)  secs


 


INR     (0.8-1.2)  


 


Sodium   130 L   (135-145)  mmol/L


 


Potassium   4.6   (3.5-5.0)  mmol/L


 


Chloride   95 L   (101-111)  mmol/L


 


Carbon Dioxide   25   (21-32)  mmol/L


 


Anion Gap   10.0   (6-13)  


 


BUN   58 H   (6-20)  mg/dL


 


Creatinine   1.5 H   (0.4-1.0)  mg/dL


 


Estimated GFR (MDRD)   33 L   (>89)  


 


Glucose   152 H   ()  mg/dL


 


Calcium   7.8 L   (8.5-10.3)  mg/dL


 


Total Bilirubin   0.6   (0.2-1.0)  mg/dL


 


AST   29   (10-42)  IU/L


 


ALT   18   (10-60)  IU/L


 


Alkaline Phosphatase   89   ()  IU/L


 


B-Natriuretic Peptide  391 H    (5-100)  pg/mL


 


Total Protein   7.0   (6.7-8.2)  g/dL


 


Albumin   3.2   (3.2-5.5)  g/dL


 


Globulin   3.8   (2.1-4.2)  g/dL


 


Albumin/Globulin Ratio   0.8 L   (1.0-2.2)  














  06/01/18 Range/Units





  01:06 


 


WBC   (4.8-10.8)  x10^3/uL


 


RBC   (4.20-5.40)  10^6/uL


 


Hgb   (12.0-16.0)  g/dL


 


Hct   (37.0-47.0)  %


 


MCV   (81.0-99.0)  fL


 


MCH   (27.0-31.0)  pg


 


MCHC   (32.0-36.0)  g/dL


 


RDW   (12.0-15.0)  %


 


Plt Count   (130-450)  10^3/uL


 


MPV   (7.9-10.8)  fL


 


Neut #   (1.5-6.6)  10^3/uL


 


Lymph #   (1.5-3.5)  10^3/uL


 


Mono #   (0.0-1.0)  10^3/uL


 


Eos #   (0.0-0.7)  10^3/uL


 


Baso #   (0.0-0.1)  10^3/uL


 


Absolute Nucleated RBC   x10^3/uL


 


Nucleated RBC %   /100WBC


 


PT  72.5 H  (9.9-12.6)  secs


 


INR  6.9 H*  (0.8-1.2)  


 


Sodium   (135-145)  mmol/L


 


Potassium   (3.5-5.0)  mmol/L


 


Chloride   (101-111)  mmol/L


 


Carbon Dioxide   (21-32)  mmol/L


 


Anion Gap   (6-13)  


 


BUN   (6-20)  mg/dL


 


Creatinine   (0.4-1.0)  mg/dL


 


Estimated GFR (MDRD)   (>89)  


 


Glucose   ()  mg/dL


 


Calcium   (8.5-10.3)  mg/dL


 


Total Bilirubin   (0.2-1.0)  mg/dL


 


AST   (10-42)  IU/L


 


ALT   (10-60)  IU/L


 


Alkaline Phosphatase   ()  IU/L


 


B-Natriuretic Peptide   (5-100)  pg/mL


 


Total Protein   (6.7-8.2)  g/dL


 


Albumin   (3.2-5.5)  g/dL


 


Globulin   (2.1-4.2)  g/dL


 


Albumin/Globulin Ratio   (1.0-2.2)  














ABX Reporting


Has patient been on IV antibiotics over the past 48 hours?: No





Assessment/Plan





- Problem List


(1) Wheezing


Impression: 





Conclusion/Plan: 


improved, but still has some wheezing at central and middle back with wheezing


continue breathing treatment


continue steroid


tele, vital monitor





CXR reveals unchanging 


pt feel better, improved significantly


continue breathing treatment


continue Lasix


daily lab monitor





not better, still wheezing 


increase Solu-metro to 60mg tid


continue breath treatment


O2 supplement as needed








pt with hx of COPD, it seem to be caused by COPD exacerbation


solu-metro


Xopenex


Ipro


O2 supplement as needed


lab and vital monitor








(2) Afib


Conclusion/Plan: 


on tele


HR controlled





stable, around 


continue tele, vital monitor





100-110 HR, continue Cardizem


continue Coumadin


check PT/INR








(3) History of CVA (cerebrovascular accident)


Conclusion/Plan: 


stable, continue home meds





it appears no new focus neurological deficit


continue home meds


continue support











(5) Hx of encephalopathy


Conclusion/Plan: 


pt is more clear minded


continue suppor and neuro check





pt present some confusion as his history


will check ammonia


support, re-oriented


neuro check








pt is with hx of encephalopathy, present some confusion


check ammonia, B12, folic acid, TSH, follow up


neuro check








(6) Hypothyroidism


Conclusion/Plan: 


TSH is lower, pt take 25 mcg levoth


recheck T4/3, follow up





check TSH, follow up








(7) hyponatremia


pt has chronic hyponatremia


continue lab monitor





Na131, IVF 75cc/hr, NS





Na 130 now


start 50 NS IVF


daily lab monitor, vital monitor








(8) CKD


slight elevated creatinine, reduce GFR


hydration


reduce Lasix to 20 daily 





(9) supratherapeutic anticoagulation


Pt's INR increase to 6.7 without Coumadin, unknown etiology


add Vit K 2.5 mg PO


check PT/INR


closely monitor if bleeding

## 2018-06-02 ENCOUNTER — HOSPITAL ENCOUNTER (OUTPATIENT)
Dept: HOSPITAL 76 - EMS | Age: 83
Discharge: NURSING, REHAB OR LONG-TERM CARE | End: 2018-06-02
Attending: SURGERY
Payer: MEDICARE

## 2018-06-02 VITALS — SYSTOLIC BLOOD PRESSURE: 129 MMHG | DIASTOLIC BLOOD PRESSURE: 63 MMHG

## 2018-06-02 DIAGNOSIS — Z74.01: ICD-10-CM

## 2018-06-02 DIAGNOSIS — J44.9: Primary | ICD-10-CM

## 2018-06-02 LAB
ALBUMIN DIAFP-MCNC: 3.1 G/DL (ref 3.2–5.5)
ALBUMIN/GLOB SERPL: 0.8 {RATIO} (ref 1–2.2)
ALP SERPL-CCNC: 93 IU/L (ref 42–121)
ALT SERPL W P-5'-P-CCNC: 19 IU/L (ref 10–60)
ANION GAP SERPL CALCULATED.4IONS-SCNC: 9 MMOL/L (ref 6–13)
AST SERPL W P-5'-P-CCNC: 25 IU/L (ref 10–42)
BASOPHILS NFR BLD AUTO: 0 10^3/UL (ref 0–0.1)
BASOPHILS NFR BLD AUTO: 0.3 %
BILIRUB BLD-MCNC: 0.7 MG/DL (ref 0.2–1)
BUN SERPL-MCNC: 48 MG/DL (ref 6–20)
CALCIUM UR-MCNC: 7.8 MG/DL (ref 8.5–10.3)
CHLORIDE SERPL-SCNC: 97 MMOL/L (ref 101–111)
CO2 SERPL-SCNC: 28 MMOL/L (ref 21–32)
CREAT SERPLBLD-SCNC: 1 MG/DL (ref 0.4–1)
EOSINOPHIL # BLD AUTO: 0 10^3/UL (ref 0–0.7)
EOSINOPHIL NFR BLD AUTO: 0 %
ERYTHROCYTE [DISTWIDTH] IN BLOOD BY AUTOMATED COUNT: 15 % (ref 12–15)
GFRSERPLBLD MDRD-ARVRAT: 52 ML/MIN/{1.73_M2} (ref 89–?)
GLOBULIN SER-MCNC: 3.7 G/DL (ref 2.1–4.2)
GLUCOSE SERPL-MCNC: 125 MG/DL (ref 70–100)
HGB UR QL STRIP: 10 G/DL (ref 12–16)
INR PPP: 1.9 (ref 0.8–1.2)
LYMPHOCYTES # SPEC AUTO: 0.2 10^3/UL (ref 1.5–3.5)
LYMPHOCYTES NFR BLD AUTO: 1.8 %
MCH RBC QN AUTO: 30.2 PG (ref 27–31)
MCHC RBC AUTO-ENTMCNC: 32 G/DL (ref 32–36)
MCV RBC AUTO: 94.5 FL (ref 81–99)
MONOCYTES # BLD AUTO: 0.5 10^3/UL (ref 0–1)
MONOCYTES NFR BLD AUTO: 4.4 %
NEUTROPHILS # BLD AUTO: 11.1 10^3/UL (ref 1.5–6.6)
NEUTROPHILS # SNV AUTO: 11.9 X10^3/UL (ref 4.8–10.8)
NEUTROPHILS NFR BLD AUTO: 93.5 %
PDW BLD AUTO: 6.7 FL (ref 7.9–10.8)
PLATELET # BLD: 335 10^3/UL (ref 130–450)
PROT SPEC-MCNC: 6.8 G/DL (ref 6.7–8.2)
PROTHROM ACT/NOR PPP: 20.7 SECS (ref 9.9–12.6)
RBC MAR: 3.32 10^6/UL (ref 4.2–5.4)
SODIUM SERPLBLD-SCNC: 134 MMOL/L (ref 135–145)

## 2018-06-02 RX ADMIN — POLYETHYLENE GLYCOL 3350 SCH GM: 17 POWDER, FOR SOLUTION ORAL at 08:43

## 2018-06-02 RX ADMIN — ACETAMINOPHEN SCH MG: 325 TABLET ORAL at 12:15

## 2018-06-02 RX ADMIN — CHOLECALCIFEROL TAB 10 MCG (400 UNIT) SCH UNIT: 10 TAB at 08:52

## 2018-06-02 RX ADMIN — FUROSEMIDE SCH MG: 20 TABLET ORAL at 08:45

## 2018-06-02 RX ADMIN — FUROSEMIDE SCH: 20 TABLET ORAL at 08:49

## 2018-06-02 RX ADMIN — LEVALBUTEROL SCH MG: 1.25 SOLUTION RESPIRATORY (INHALATION) at 08:24

## 2018-06-02 RX ADMIN — CALCIUM CARBONATE (ANTACID) CHEW TAB 500 MG SCH MG: 500 CHEW TAB at 08:44

## 2018-06-02 RX ADMIN — SODIUM CHLORIDE, PRESERVATIVE FREE SCH ML: 5 INJECTION INTRAVENOUS at 08:49

## 2018-06-02 RX ADMIN — FUROSEMIDE SCH MG: 20 TABLET ORAL at 08:48

## 2018-06-02 RX ADMIN — AZITHROMYCIN MONOHYDRATE SCH MG: 250 TABLET ORAL at 08:45

## 2018-06-02 RX ADMIN — THERA TABS SCH TAB: TAB at 08:44

## 2018-06-02 RX ADMIN — SODIUM CHLORIDE, PRESERVATIVE FREE SCH ML: 5 INJECTION INTRAVENOUS at 02:19

## 2018-06-02 RX ADMIN — ACETAMINOPHEN SCH MG: 325 TABLET ORAL at 05:51

## 2018-06-02 RX ADMIN — LEVOTHYROXINE SODIUM SCH MCG: 0.03 TABLET ORAL at 05:51

## 2018-06-02 RX ADMIN — IPRATROPIUM BROMIDE SCH MG: 0.5 SOLUTION RESPIRATORY (INHALATION) at 08:24

## 2018-06-02 NOTE — DISCHARGE SUMMARY
Discharge Summary


Discharge Date: 06/02/18


Discharging Provider: NIDHI COMBS


Primary Care Provider: Brea Aleman


Condition at Discharge: Poor


Discharge Disposition: 03 SNF DC/Xfer


Discharge Facility Name: [facility]





- DIAGNOSES


Admission Diagnoses: 


(1) Wheezing





(2) Afib








(3) History of CVA (cerebrovascular accident)








(4) Hx of encephalopathy





(5) Hypothyroidism





Discharge Diagnoses with Status of Each Condition: 


(1) Wheezing


resolve. pt had 96% sats on room air. 


(2) Afib


chronic, stable, continue Coumadin, continue to be managed by her PCP.


(3) History of CVA (cerebrovascular accident)


stable, continue in SNF, continue to be managed by her PCP





(5) Hx of encephalopathy


stable





(6) Hypothyroidism


stable





(7) hyponatremia


Na 134, chronic, stable





(8) CKD


chronic, stable as per's baseline


(9) supratherapeutic anticoagulation


resolved





- HPI


History of Present Illness: 


Patient is an 89-year-old female with past medical history significant for 

hypertension, hypothyroidism, COPD, vitamin D deficiency, history of stroke in 

2016 with minimal residual deficits and atrial fibrillation on Coumadin, UTI, 

and encephalopathy, who presented to the emergency department with chief 

complaint of shortness of breath. Pt denies chest pain, fever, chill. Pt's 

daughter report pt feels shortness of breath and wheezing in the morning. Pt is 

current living at [facility]. Pt got improved when Duoneb wag given at ER. Lab 

test in ER was unremarkable. Pt is afebrile, sats is 99% at 3 liter of O2 with 

mild to moderate wheezing. Pt is admitted for COPD exacerbation in observation 

unit now. 











- ALLERGIES


Allergies/Adverse Reactions: 


 Allergies











Allergy/AdvReac Type Severity Reaction Status Date / Time


 


No Known Drug Allergies Allergy   Verified 11/08/16 05:37














- MEDICATIONS


Home Medications: 


 Ambulatory Orders











 Medication  Instructions  Recorded  Confirmed


 


Diltiazem HCl [Diltiazem 24Hr ER] 240 mg PO DAILY 08/14/14 05/29/18


 


Multivitamin [Theragran] 1 tab PO DAILYWM #0 tablet 03/12/18 05/29/18


 


Potassium Chloride 10 meq PO DAILY #0 03/12/18 05/29/18


 


Pravastatin Sodium 10 mg PO QPM 05/17/18 05/29/18


 


Acetaminophen 650 mg PO Q4H PRN 05/29/18 05/29/18


 


Acetaminophen [Tylenol] 650 mg PO Q6H 05/29/18 05/29/18


 


Calcium Carbonate/Vitamin D3 1 tab PO DAILY 05/29/18 05/29/18





[Calcium 500-Vit D3 200 Tablet]   


 


Cyclobenzaprine HCl 10 mg PO TID PRN 05/29/18 05/29/18


 


Furosemide [Lasix] 20 mg PO DAILY 05/29/18 05/29/18


 


Levalbuterol HCl 1.25 mg INH BID 05/29/18 05/29/18


 


Levothyroxine [Synthroid] 25 mcg PO QDAC 05/29/18 05/29/18


 


Tramadol HCl 50 mg PO Q6H PRN 05/29/18 05/29/18


 


Warfarin [Coumadin] 1.5 mg PO 1700 05/29/18 05/29/18


 


guaiFENesin/DEXTROMETHORPHAN 10 ml PO Q4H PRN 05/29/18 05/29/18





[Robitussin Dm]   


 


predniSONE [Deltasone] 10 mg PO [id number] #11 tab 06/02/18 














- PHYSICAL EXAM AT DISCHARGE


General Appearance: positive: No acute distress, Alert.  negative: Lethargic


Eyes Bilateral: positive: Normal inspection, PERRL, No lid inflammation, 

Conjunctivae nml


ENT: positive: ENT inspection nml, Pharynx nml, No signs of dehydration.  

negative: Purulent nasal drainage, Pharyngeal erythema, Oral lesions


Neck: positive: Nml inspection, Thyroid nml, No JVD, Trachea midline.  negative

: Thyromegaly, Lymphadenopathy (R), Lymphadenopathy (L), Stiff neck, Swelling/

bruising, Tracheal deviation


Respiratory: positive: Chest non-tender, No respiratory distress, Breath sounds 

nml.  negative: Wheezes, Rales, Rhonchi


Cardiovascular: positive: No gallop, Irregularly irregular, Systolic murmur.  

negative: Extrasystoles, Tachycardia, Bradycardia


Peripheral Pulses: positive: 2+


Abdomen: positive: Non-tender, No organomegaly, Nml bowel sounds, No 

distention.  negative: Tenderness, Guarding, Rebound


Back: positive: Nml inspection.  negative: CVA tenderness (R), CVA tenderness (L

)


Skin: positive: Color nml, No rash, Warm, Dry.  negative: Cyanosis, Diaphoresis

, Pallor


Extremities: positive: Non-tender.  negative: Full ROM, Calf tenderness, Joint 

swelling, Daiana's sign/cords


Neurologic/Psychiatric: positive: Slurred/abnml speech.  negative: Sensory loss

, Facial droop





- LABS


Result Diagrams: 


 06/02/18 06:20





 06/02/18 06:20





- FOLLOW UP


Follow Up: 


May follow up her PCP at arrival to [facility]








- TIME SPENT


Time Spent in Discharge (Minutes): 50

## 2018-06-02 NOTE — DISCHARGE PLAN
Discharge Plan for SNF / [facility]





- DC Plan and Transition Orders


Disposition: 03 SNF DC/Xfer


Condition: Poor


SNF Transition Orders: 





Admit to: [[facility]] under the care of [Doctor Brea Soni]





Discharge Diagnosis:  


[wheezing/COPD exacerbation, Afib with coumadin, hx of CVA, CKD, hypothyroidism]





Medicare Certification: I certify that [facility] skilled nursing care is 

medically necessary on a continuing basis for any of the conditions for which 

she/he is receiving care during hospitalization.





 Notify PCP of admission and forward orders to primary provider for signature.





Weight on admission and [47kg].  Call PCP immediately if weight increases by [4

] pounds or if patient develops dyspnea, chest pain/tightness or edema.





House Bowel Program: [Yes]


If no BM after 2 days, nurse may give M.O.M. 30ml PO PRN and /or ducolax Supp 1 

NC and /or KATIA 250mg P.O., and/or senna 1-2 tabs PO.  On day 3 nurse may give 

repeat above order until residents constipation is resolved. 





Immunizations:





Annual Influenza Vaccine: [Yes].


(between Sept 1st and March 31st.) Unless allergy or already given





Two-Step PPD: [Yes]


per [facility] [id number] or appropriate documentation of approved exceptions


   


Treatments & Other Orders: [May follow up Brea Bolaños at pt's arrival to 

[facility]]   





Oxygen Orders: [PRN]      





Lab Tests or X-Rays Orders: [may follow up Dr. Soni, may have PT/INR on 06/05/ 2018]





Orthopedic Orders: [n].








Medications:





PLEASE REFER TO THE DISCHARGE MEDICATION LIST.








Insulin Orders? [No]





Diagnosis: Diabetes


Initiate hypo and hyperglycemia protocols for BG <70 and BG >375.  May check BG 

prn for signs/symptoms of dysglycemia.





Frequency of BG checks: [AC/Meal/HS]





Basal Insulin:


   


   [] Lantus  100 units / ml inject subq as follows: []


   [] Other: []





Correction Insulin: -  Select the type of insulin below





   [Choose: Novolog/Humalog]100 units /ml insulin inject subq per orders 

indicate below














[] LOW DOSE


  [] MODERATE DOSE


  [] MODERATE/HIGH     


       DOSE [] HIGH DOSE


 


 


    GB               UNITS       GB               UNITS         GB             

  UNITS    GB               UNITS


 


 


         0 UNITS          0 UNITS          0 UNITS     

     0 UNITS


 


 


141-175       1 UNITS 141-175       1 UNITS 141-175       2 UNITS 141-175       

3 UNITS


 


 


176-225       2 UNITS 176-225       3 UNITS 176-225       4 UNITS 176-225       

5 UNITS


 


 


226-275       3 UNITS 226-275       5 UNITS 226-275       6 UNITS 226-275       

7 UNITS


 


 


276-325       4 UNITS 276-325       7 UNITS 276-325       8 UNITS 276-325       

9 UNITS


 


 


326-375       5 UNITS 326-375       9 UNITS 326-375      10 UNITS 326-375      

11 UNITS


 


 


>375    CONTACT MD >375    CONTACT MD >375    CONTACT MD >375    CONTACT MD


 











Custom Dosing: 





   [Choose: None/Novolog/Humalog] 100 units/ml Insulin inject subq as follows:











   GB    Units


 


 [] Units


 


141-175 [] Units


 


176-225 [] Units


 


226-275 [] Units


 


276-325 []Units


 


326-375 [] Units


 


>375 Contact MD














Allergies and Adverse Reactions: 


 Allergies











Allergy/AdvReac Type Severity Reaction Status Date / Time


 


No Known Drug Allergies Allergy   Verified 11/08/16 05:37














- Medications


New Prescriptions: 


predniSONE [Deltasone] 10 mg PO [id number] #11 tab





- Diet


Type: Geriatric


Texture: Regular


Liquids: Thin


May have monthly special meal: Yes





- Therapies | Activity


Therapy: Evaluation | Treat if indicated: PT, OT


Rehabilitation Potential: Maximize functional status


Activity: Activity as Tolerated


Additional Instructions: 


May follow up Brea Bolaños at pt's arrival to [facility]

## 2018-06-04 ENCOUNTER — HOSPITAL ENCOUNTER (OUTPATIENT)
Dept: HOSPITAL 76 - LAB.R | Age: 83
Discharge: HOME | End: 2018-06-04
Attending: SKILLED NURSING FACILITY
Payer: MEDICARE

## 2018-06-04 DIAGNOSIS — I48.91: Primary | ICD-10-CM

## 2018-06-04 LAB
INR PPP: 2.9 (ref 0.8–1.2)
PROTHROM ACT/NOR PPP: 31.7 SECS (ref 9.9–12.6)

## 2018-06-04 PROCEDURE — 85610 PROTHROMBIN TIME: CPT

## 2018-06-04 PROCEDURE — 85730 THROMBOPLASTIN TIME PARTIAL: CPT

## 2018-06-05 ENCOUNTER — HOSPITAL ENCOUNTER (OUTPATIENT)
Dept: HOSPITAL 76 - LAB.R | Age: 83
Discharge: HOME | End: 2018-06-05
Attending: SKILLED NURSING FACILITY
Payer: MEDICARE

## 2018-06-05 DIAGNOSIS — I10: Primary | ICD-10-CM

## 2018-06-05 DIAGNOSIS — D64.9: ICD-10-CM

## 2018-06-05 LAB
ALBUMIN DIAFP-MCNC: 3.2 G/DL (ref 3.2–5.5)
ALBUMIN/GLOB SERPL: 0.9 {RATIO} (ref 1–2.2)
ALP SERPL-CCNC: 96 IU/L (ref 42–121)
ALT SERPL W P-5'-P-CCNC: 19 IU/L (ref 10–60)
ANION GAP SERPL CALCULATED.4IONS-SCNC: 9 MMOL/L (ref 6–13)
AST SERPL W P-5'-P-CCNC: 22 IU/L (ref 10–42)
BASOPHILS NFR BLD AUTO: 0 10^3/UL (ref 0–0.1)
BASOPHILS NFR BLD AUTO: 0.2 %
BILIRUB BLD-MCNC: 0.5 MG/DL (ref 0.2–1)
BUN SERPL-MCNC: 36 MG/DL (ref 6–20)
CALCIUM UR-MCNC: 7.9 MG/DL (ref 8.5–10.3)
CHLORIDE SERPL-SCNC: 98 MMOL/L (ref 101–111)
CO2 SERPL-SCNC: 26 MMOL/L (ref 21–32)
CREAT SERPLBLD-SCNC: 0.9 MG/DL (ref 0.4–1)
EOSINOPHIL # BLD AUTO: 0 10^3/UL (ref 0–0.7)
EOSINOPHIL NFR BLD AUTO: 0 %
ERYTHROCYTE [DISTWIDTH] IN BLOOD BY AUTOMATED COUNT: 15.5 % (ref 12–15)
GFRSERPLBLD MDRD-ARVRAT: 59 ML/MIN/{1.73_M2} (ref 89–?)
GLOBULIN SER-MCNC: 3.6 G/DL (ref 2.1–4.2)
GLUCOSE SERPL-MCNC: 151 MG/DL (ref 70–100)
HGB UR QL STRIP: 10.3 G/DL (ref 12–16)
LYMPHOCYTES # SPEC AUTO: 0.4 10^3/UL (ref 1.5–3.5)
LYMPHOCYTES NFR BLD AUTO: 3.6 %
MCH RBC QN AUTO: 31.3 PG (ref 27–31)
MCHC RBC AUTO-ENTMCNC: 33.4 G/DL (ref 32–36)
MCV RBC AUTO: 93.8 FL (ref 81–99)
MONOCYTES # BLD AUTO: 0.7 10^3/UL (ref 0–1)
MONOCYTES NFR BLD AUTO: 6.8 %
NEUTROPHILS # BLD AUTO: 8.9 10^3/UL (ref 1.5–6.6)
NEUTROPHILS # SNV AUTO: 10 X10^3/UL (ref 4.8–10.8)
NEUTROPHILS NFR BLD AUTO: 89.4 %
PDW BLD AUTO: 7.3 FL (ref 7.9–10.8)
PLATELET # BLD: 346 10^3/UL (ref 130–450)
PROT SPEC-MCNC: 6.8 G/DL (ref 6.7–8.2)
RBC MAR: 3.3 10^6/UL (ref 4.2–5.4)
SODIUM SERPLBLD-SCNC: 133 MMOL/L (ref 135–145)

## 2018-06-05 PROCEDURE — 80053 COMPREHEN METABOLIC PANEL: CPT

## 2018-06-05 PROCEDURE — 85025 COMPLETE CBC W/AUTO DIFF WBC: CPT

## 2018-06-10 ENCOUNTER — HOSPITAL ENCOUNTER (OUTPATIENT)
Dept: HOSPITAL 76 - LAB.R | Age: 83
Discharge: HOME | End: 2018-06-10
Attending: SKILLED NURSING FACILITY
Payer: MEDICARE

## 2018-06-10 DIAGNOSIS — E03.9: ICD-10-CM

## 2018-06-10 DIAGNOSIS — I10: Primary | ICD-10-CM

## 2018-06-10 DIAGNOSIS — D64.9: ICD-10-CM

## 2018-06-10 LAB
ANION GAP SERPL CALCULATED.4IONS-SCNC: 10 MMOL/L (ref 6–13)
BASOPHILS NFR BLD AUTO: 0 10^3/UL (ref 0–0.1)
BASOPHILS NFR BLD AUTO: 0.2 %
BUN SERPL-MCNC: 27 MG/DL (ref 6–20)
CALCIUM UR-MCNC: 8 MG/DL (ref 8.5–10.3)
CHLORIDE SERPL-SCNC: 97 MMOL/L (ref 101–111)
CO2 SERPL-SCNC: 25 MMOL/L (ref 21–32)
CREAT SERPLBLD-SCNC: 0.9 MG/DL (ref 0.4–1)
EOSINOPHIL # BLD AUTO: 0.1 10^3/UL (ref 0–0.7)
EOSINOPHIL NFR BLD AUTO: 0.9 %
ERYTHROCYTE [DISTWIDTH] IN BLOOD BY AUTOMATED COUNT: 15.5 % (ref 12–15)
GFRSERPLBLD MDRD-ARVRAT: 59 ML/MIN/{1.73_M2} (ref 89–?)
GLUCOSE SERPL-MCNC: 131 MG/DL (ref 70–100)
HGB UR QL STRIP: 11.8 G/DL (ref 12–16)
LYMPHOCYTES # SPEC AUTO: 0.6 10^3/UL (ref 1.5–3.5)
LYMPHOCYTES NFR BLD AUTO: 6.8 %
MCH RBC QN AUTO: 31.6 PG (ref 27–31)
MCHC RBC AUTO-ENTMCNC: 32.7 G/DL (ref 32–36)
MCV RBC AUTO: 96.7 FL (ref 81–99)
MONOCYTES # BLD AUTO: 0.6 10^3/UL (ref 0–1)
MONOCYTES NFR BLD AUTO: 6.2 %
NEUTROPHILS # BLD AUTO: 8 10^3/UL (ref 1.5–6.6)
NEUTROPHILS # SNV AUTO: 9.3 X10^3/UL (ref 4.8–10.8)
NEUTROPHILS NFR BLD AUTO: 85.9 %
PDW BLD AUTO: 7.3 FL (ref 7.9–10.8)
PLATELET # BLD: 345 10^3/UL (ref 130–450)
RBC MAR: 3.75 10^6/UL (ref 4.2–5.4)
SODIUM SERPLBLD-SCNC: 132 MMOL/L (ref 135–145)

## 2018-06-10 PROCEDURE — 85025 COMPLETE CBC W/AUTO DIFF WBC: CPT

## 2018-06-10 PROCEDURE — 80048 BASIC METABOLIC PNL TOTAL CA: CPT

## 2018-06-10 PROCEDURE — 84443 ASSAY THYROID STIM HORMONE: CPT

## 2022-11-08 NOTE — PROVIDER PROGRESS NOTE
Chief complaint:   Chief Complaint   Patient presents with   • Foot Injury     Right foot fx, was seen in ED on 11/6/2022       Vitals:  Visit Vitals  /82   Pulse 82   Temp 97.8 °F (36.6 °C) (Temporal)   Wt 69.4 kg (153 lb)   LMP  (LMP Unknown)   SpO2 98%   BMI 25.46 kg/m²       HISTORY OF PRESENT ILLNESS     Coby Todd, patient presents with right foot injury that occurred this past Saturday (11/05/2022).  Patient reports that she was chasing her grandson in the living room because he was carrying something that he was not supposed to when her right foot hit the end of the large living room coffee table.  Patient reports severe pain, however, after enough time had passed, was able to sleep through it.  But, with no improvements and swelling and bruising the following day, patient presented to the ED on 11/06/2022 and was diagnosed with nondisplaced, mildly comminuted  fracture involving the distal aspect of the proximal right fifth phalanx.  Patient was instructed to buddy tape it, postop shoe, and ibuprofen/Tylenol as needed for pain including rice.  Patient reports that she recently tried to go to work, was having difficulty with putting on her mandatory steel toe boots and was advised by work to follow-up with her PCP considering she could not even put on her work shoes to work.  Today, patient reports that she is able to put on her still toe boots, but that her right foot pain is still very painful.  Patient reports that because this was not work related injury, she would be unable to have any work restrictions and that patient cannot of for any FMLA/meds time at work.    PAST MEDICAL, FAMILY AND SOCIAL HISTORY     Medications:  Current Outpatient Medications   Medication Sig Dispense Refill   • albuterol 108 (90 Base) MCG/ACT inhaler Inhale 2 puffs into the lungs every 6 hours as needed for Shortness of Breath. 1 each 0   • hydrOXYzine (ATARAX) 25 MG tablet Take 1 tablet by mouth every 4 hours as  Subjective





- Prog Note Date


Prog Note Date: 05/31/18





- Subjective


Pt reports feeling: Improved


Subjective: 


pt's wheezing is great improved. pt feel better. No fever, chill, CP








Current Medications





- Current Medications


Current Medications: 





Active Medications





Acetaminophen (Tylenol)  650 mg PO Q4HR PRN


   PRN Reason: Pain 1 to 4


Acetaminophen (Tylenol)  650 mg PO Q6H [facility]


   Last Admin: 05/31/18 10:16 Dose:  650 mg


Atorvastatin Calcium (Lipitor)  10 mg PO QPM [facility]


   Last Admin: 05/30/18 21:38 Dose:  10 mg


Azithromycin (Zithromax)  250 mg PO DAILY [facility]


   Last Admin: 05/31/18 10:15 Dose:  250 mg


Calcium Carbonate/Glycine (Tums)  500 mg PO DAILY [facility]


   Last Admin: 05/31/18 10:15 Dose:  500 mg


Cholecalciferol (Vitamin D3)  200 unit PO DAILY [facility]


   Last Admin: 05/31/18 10:12 Dose:  200 unit


Cyclobenzaprine HCl (Flexeril)  10 mg PO TID PRN


   PRN Reason: COCCYX FRACTURE


Diltiazem HCl (Cardizem Cd)  240 mg PO DAILY [facility]


   Last Admin: 05/31/18 10:13 Dose:  Not Given


Furosemide (Lasix)  20 mg PO BIDDIURETIC [facility]


   Last Admin: 05/31/18 15:04 Dose:  20 mg


Guaifenesin (Robitussin Dm)  10 ml PO Q4H PRN


   PRN Reason: Cough


Ipratropium Bromide (Atrovent)  0.5 mg INH RTQID PRN


   PRN Reason: Wheezing


   Last Admin: 05/29/18 18:23 Dose:  0.5 mg


Ipratropium Bromide (Atrovent)  0.5 mg INH RTBID [facility]


   Last Admin: 05/31/18 08:12 Dose:  0.5 mg


Levalbuterol HCl (Xopenex)  1.25 mg INH RTQID PRN


   PRN Reason: SHORTNESS OF AIR/WHEEZING


   Last Admin: 05/30/18 10:54 Dose:  1.25 mg


Levalbuterol HCl (Xopenex)  1.25 mg INH RTBID [facility]


   Last Admin: 05/31/18 08:12 Dose:  1.25 mg


Levothyroxine Sodium (Synthroid)  25 mcg PO QDAC [facility]


   Last Admin: 05/31/18 10:14 Dose:  25 mcg


Methylprednisolone (Solu-Medrol (40mg Vial))  30 mg IVP TID [facility]


   Last Admin: 05/31/18 14:29 Dose:  30 mg


Morphine Sulfate (Roxanol)  10 mg PO Q2HR PRN


   PRN Reason: PAIN


   Last Admin: 05/30/18 19:25 Dose:  10 mg


Multivitamins (Theragran)  1 tab PO DAILYWM [facility]


   Last Admin: 05/31/18 10:15 Dose:  1 tab


Ondansetron HCl (Zofran Inj)  4 mg IVP Q6HR PRN


   PRN Reason: Nausea / Vomiting


Ondansetron HCl (Zofran Odt)  4 mg TL Q6HR PRN


   PRN Reason: Nausea / Vomiting


Polyethylene Glycol (Miralax)  17 gm PO DAILY [facility]


   Last Admin: 05/31/18 10:15 Dose:  17 gm


Sodium Chloride (Normal Saline Flush 0.9%)  10 ml IVP PRN PRN


   PRN Reason: AS NEEDED PER PROVIDER ORDERS


   Last Admin: 05/31/18 05:48 Dose:  10 ml


Sodium Chloride (Normal Saline Flush 0.9%)  10 ml IVP 0100,0900,1700 [facility]


   Last Admin: 05/31/18 09:15 Dose:  Not Given


Tramadol HCl (Ultram)  50 mg PO Q6H PRN


   PRN Reason: PAIN





 





Diltiazem HCl [Diltiazem 24Hr ER] 240 mg PO DAILY 08/14/14 


Pravastatin Sodium 10 mg PO QPM 05/17/18 


Acetaminophen 650 mg PO Q4H PRN 05/29/18 


Acetaminophen [Tylenol] 650 mg PO Q6H 05/29/18 


Calcium Carbonate/Vitamin D3 [Calcium 500-Vit D3 200 Tablet] 1 tab PO DAILY 05/ 29/18 


Cyclobenzaprine HCl 10 mg PO TID PRN 05/29/18 


Furosemide [Lasix] 20 mg PO DAILY 05/29/18 


Levalbuterol HCl 1.25 mg INH BID 05/29/18 


Levothyroxine [Synthroid] 25 mcg PO QDAC 05/29/18 


Tramadol HCl 50 mg PO Q6H PRN 05/29/18 


Warfarin [Coumadin] 1.5 mg PO 1700 05/29/18 


guaiFENesin/DEXTROMETHORPHAN [Robitussin Dm] 10 ml PO Q4H PRN 05/29/18 











Objective





- Vital Signs/Intake & Output


Reviewed Vital Signs: Yes


Vital Signs: 


 Vital Signs x48h











  Temp Pulse Pulse Resp BP BP Pulse Ox


 


 05/31/18 11:59  36.3 C L   92  18   103/58 L  96


 


 05/31/18 08:57       131/63 H 


 


 05/31/18 08:55  37.0 C      


 


 05/31/18 08:12   65   18   


 


 05/31/18 05:00  36.5 C   70  20  123/61   97











Intake & Output: 


 Intake & Output











 05/28/18 05/29/18 05/30/18 05/31/18





 23:59 23:59 23:59 23:59


 


Intake Total   536 660


 


Balance   536 660














- Objective


General Appearance: positive: No acute distress, Alert.  negative: Lethargic


Eyes Bilateral: positive: Normal inspection, PERRL, No lid inflammation, 

Conjunctivae nml


ENT: positive: ENT inspection nml, Pharynx nml, No signs of dehydration.  

negative: Purulent nasal drainage, Pharyngeal erythema, Oral lesions


Neck: positive: Nml inspection, Thyroid nml, No JVD, Trachea midline.  negative

: Thyromegaly, Lymphadenopathy (R), Lymphadenopathy (L), Stiff neck, Swelling/

bruising, Tracheal deviation


Respiratory: positive: Chest non-tender, No respiratory distress, Breath sounds 

nml, Wheezes.  negative: Rales, Rhonchi


Cardiovascular: positive: Regular rate & rhythm, No murmur, No gallop.  negative

: Irregularly irregular, Extrasystoles, Tachycardia, Bradycardia, JVD present, 

Systolic murmur, Diastolic murmur


Peripheral Pulses: 2+ Radial (R), 2+ Radial (L), 2+ Dorsalis pedis (R), 2+ 

Dorsalis pedis (L)


Abdomen: positive: Non-tender, No organomegaly, Nml bowel sounds, No 

distention.  negative: Tenderness, Guarding, Rebound


Back: positive: Nml inspection.  negative: CVA tenderness (R), CVA tenderness (L

)


Skin: positive: Color nml, No rash, Warm, Dry.  negative: Cyanosis, Diaphoresis

, Pallor


Extremities: negative: Calf tenderness, Daiana's sign/cords


Neurologic/Psychiatric: positive: Mood/affect nml, Slurred/abnml speech.  

negative: Sensory loss, Facial droop, Depressed mood/affect





- Lab Results


Fish Bones: 


 05/31/18 05:42





 05/31/18 05:40


Other Labs: 


 Lab Results x24hrs











  05/31/18 05/31/18 05/31/18 Range/Units





  06:45 05:42 05:40 


 


WBC   11.5 H   (4.8-10.8)  x10^3/uL


 


RBC   3.06 L   (4.20-5.40)  10^6/uL


 


Hgb   9.5 L   (12.0-16.0)  g/dL


 


Hct   29.0 L   (37.0-47.0)  %


 


MCV   94.6   (81.0-99.0)  fL


 


MCH   31.1 H   (27.0-31.0)  pg


 


MCHC   32.9   (32.0-36.0)  g/dL


 


RDW   15.1 H   (12.0-15.0)  %


 


Plt Count   354   (130-450)  10^3/uL


 


MPV   7.3 L   (7.9-10.8)  fL


 


Neut #   10.6 H   (1.5-6.6)  10^3/uL


 


Lymph #   0.5 L   (1.5-3.5)  10^3/uL


 


Mono #   0.4   (0.0-1.0)  10^3/uL


 


Eos #   0.0   (0.0-0.7)  10^3/uL


 


Baso #   0.0   (0.0-0.1)  10^3/uL


 


Absolute Nucleated RBC   0.02   x10^3/uL


 


Nucleated RBC %   0.2   /100WBC


 


PT     (9.9-12.6)  secs


 


INR     (0.8-1.2)  


 


Sodium     (135-145)  mmol/L


 


Potassium     (3.5-5.0)  mmol/L


 


Chloride     (101-111)  mmol/L


 


Carbon Dioxide     (21-32)  mmol/L


 


Anion Gap     (6-13)  


 


BUN     (6-20)  mg/dL


 


Creatinine     (0.4-1.0)  mg/dL


 


Estimated GFR (MDRD)     (>89)  


 


Glucose     ()  mg/dL


 


Calcium     (8.5-10.3)  mg/dL


 


Total Bilirubin     (0.2-1.0)  mg/dL


 


AST     (10-42)  IU/L


 


ALT     (10-60)  IU/L


 


Alkaline Phosphatase     ()  IU/L


 


Ammonia  9.0    (7-35)  umol/L


 


Total Protein     (6.7-8.2)  g/dL


 


Albumin     (3.2-5.5)  g/dL


 


Globulin     (2.1-4.2)  g/dL


 


Albumin/Globulin Ratio     (1.0-2.2)  


 


Vitamin B12    808  (180-914)  pg/mL


 


Free T4     (0.58-1.64)  ng/dL


 


Free T3 pg/mL     (2.5-3.9)  pg/mL














  05/31/18 05/31/18 05/31/18 Range/Units





  05:40 05:40 05:40 


 


WBC     (4.8-10.8)  x10^3/uL


 


RBC     (4.20-5.40)  10^6/uL


 


Hgb     (12.0-16.0)  g/dL


 


Hct     (37.0-47.0)  %


 


MCV     (81.0-99.0)  fL


 


MCH     (27.0-31.0)  pg


 


MCHC     (32.0-36.0)  g/dL


 


RDW     (12.0-15.0)  %


 


Plt Count     (130-450)  10^3/uL


 


MPV     (7.9-10.8)  fL


 


Neut #     (1.5-6.6)  10^3/uL


 


Lymph #     (1.5-3.5)  10^3/uL


 


Mono #     (0.0-1.0)  10^3/uL


 


Eos #     (0.0-0.7)  10^3/uL


 


Baso #     (0.0-0.1)  10^3/uL


 


Absolute Nucleated RBC     x10^3/uL


 


Nucleated RBC %     /100WBC


 


PT    39.1 H  (9.9-12.6)  secs


 


INR    3.6 H  (0.8-1.2)  


 


Sodium     (135-145)  mmol/L


 


Potassium     (3.5-5.0)  mmol/L


 


Chloride     (101-111)  mmol/L


 


Carbon Dioxide     (21-32)  mmol/L


 


Anion Gap     (6-13)  


 


BUN     (6-20)  mg/dL


 


Creatinine     (0.4-1.0)  mg/dL


 


Estimated GFR (MDRD)     (>89)  


 


Glucose     ()  mg/dL


 


Calcium     (8.5-10.3)  mg/dL


 


Total Bilirubin     (0.2-1.0)  mg/dL


 


AST     (10-42)  IU/L


 


ALT     (10-60)  IU/L


 


Alkaline Phosphatase     ()  IU/L


 


Ammonia     (7-35)  umol/L


 


Total Protein     (6.7-8.2)  g/dL


 


Albumin     (3.2-5.5)  g/dL


 


Globulin     (2.1-4.2)  g/dL


 


Albumin/Globulin Ratio     (1.0-2.2)  


 


Vitamin B12     (180-914)  pg/mL


 


Free T4  0.77    (0.58-1.64)  ng/dL


 


Free T3 pg/mL   2.10 L   (2.5-3.9)  pg/mL














  05/31/18 Range/Units





  05:40 


 


WBC   (4.8-10.8)  x10^3/uL


 


RBC   (4.20-5.40)  10^6/uL


 


Hgb   (12.0-16.0)  g/dL


 


Hct   (37.0-47.0)  %


 


MCV   (81.0-99.0)  fL


 


MCH   (27.0-31.0)  pg


 


MCHC   (32.0-36.0)  g/dL


 


RDW   (12.0-15.0)  %


 


Plt Count   (130-450)  10^3/uL


 


MPV   (7.9-10.8)  fL


 


Neut #   (1.5-6.6)  10^3/uL


 


Lymph #   (1.5-3.5)  10^3/uL


 


Mono #   (0.0-1.0)  10^3/uL


 


Eos #   (0.0-0.7)  10^3/uL


 


Baso #   (0.0-0.1)  10^3/uL


 


Absolute Nucleated RBC   x10^3/uL


 


Nucleated RBC %   /100WBC


 


PT   (9.9-12.6)  secs


 


INR   (0.8-1.2)  


 


Sodium  131 L  (135-145)  mmol/L


 


Potassium  5.3 H  (3.5-5.0)  mmol/L


 


Chloride  95 L  (101-111)  mmol/L


 


Carbon Dioxide  25  (21-32)  mmol/L


 


Anion Gap  11.0  (6-13)  


 


BUN  43 H  (6-20)  mg/dL


 


Creatinine  1.3 H  (0.4-1.0)  mg/dL


 


Estimated GFR (MDRD)  39 L  (>89)  


 


Glucose  132 H  ()  mg/dL


 


Calcium  8.3 L  (8.5-10.3)  mg/dL


 


Total Bilirubin  0.7  (0.2-1.0)  mg/dL


 


AST  38  (10-42)  IU/L


 


ALT  19  (10-60)  IU/L


 


Alkaline Phosphatase  104  ()  IU/L


 


Ammonia   (7-35)  umol/L


 


Total Protein  7.4  (6.7-8.2)  g/dL


 


Albumin  3.4  (3.2-5.5)  g/dL


 


Globulin  4.0  (2.1-4.2)  g/dL


 


Albumin/Globulin Ratio  0.9 L  (1.0-2.2)  


 


Vitamin B12   (180-914)  pg/mL


 


Free T4   (0.58-1.64)  ng/dL


 


Free T3 pg/mL   (2.5-3.9)  pg/mL














ABX Reporting


Has patient been on IV antibiotics over the past 48 hours?: No





Assessment/Plan





- Problem List


(1) Wheezing


Impression: 


Conclusion/Plan: 


CXR reveals unchanging 


pt feel better, improved significantly


continue breathing treatment


continue Lasix


daily lab monitor





not better, still wheezing 


increase Solu-metro to 60mg tid


continue breath treatment


O2 supplement as needed








pt with hx of COPD, it seem to be caused by COPD exacerbation


solu-metro


Xopenex


Ipro


O2 supplement as needed


lab and vital monitor








(2) Afib


Conclusion/Plan: 


stable, around 


continue tele, vital monitor





100-110 HR, continue Cardizem


continue Coumadin


check PT/INR








(3) History of CVA (cerebrovascular accident)


Conclusion/Plan: 


stable, continue home meds





it appears no new focus neurological deficit


continue home meds


continue support











(5) Hx of encephalopathy


Conclusion/Plan: 


pt present some confusion as his history


will check ammonia


support, re-oriented


neuro check








pt is with hx of encephalopathy, present some confusion


check ammonia, B12, folic acid, TSH, follow up


neuro check








(6) Hypothyroidism


Conclusion/Plan: 


TSH is lower, pt take 25 mcg levoth


recheck T4/3, follow up





check TSH, follow up








(7) hyponatremia


Na131, IVF 75cc/hr, NS





Na 130 now


start 50 NS IVF


daily lab monitor, vital monitor








(8) dehydration


pt had slight elevated creatinine and BUN


gently hydration, 


daily lab monitor


vital monitor needed (Panic attacks & Insomnia). 56 tablet 0   • escitalopram (LEXAPRO) 20 MG tablet Take 1 tablet by mouth daily. 90 tablet 0   • PEDIATRIC MULTIPLE VIT-C-FA PO Take 1 tablet by mouth daily.     • NON FORMULARY Beet powder.  Takes twice weekly.     • ibuprofen (MOTRIN) 200 MG tablet Take 200 mg by mouth.       No current facility-administered medications for this visit.       Allergies:  ALLERGIES:   Allergen Reactions   • Seasonal Other (See Comments)     Watery eyes, runny nose.       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   • Pneumonia 1990   • RAD (reactive airway disease)        Past Surgical History:   Procedure Laterality Date   • Colonoscopy  2014    at [facility]   • Esophagogastroduodenoscopy  2014    at [facility]   • Tubal ligation         Family History:  Family History   Problem Relation Age of Onset   • Diabetes Daughter    • Diabetes Maternal Aunt    • Diabetes Maternal Grandmother    • Heart disease Maternal Grandmother    • Hypothyroid Mother        Social History:  Social History     Tobacco Use   • Smoking status: Current Every Day Smoker     Packs/day: 0.50     Years: 15.00     Pack years: 7.50     Types: Cigarettes   • Smokeless tobacco: Never Used   Substance Use Topics   • Alcohol use: No     Comment: occasionally       REVIEW OF SYSTEMS     Please refer to HPI, otherwise rest of ROS is negative.    PHYSICAL EXAM     Physical Exam  Vitals and nursing note reviewed.   Constitutional:       Appearance: Normal appearance.   Cardiovascular:      Pulses: Normal pulses.           Dorsalis pedis pulses are 2+ on the right side.        Posterior tibial pulses are 2+ on  the right side.   Pulmonary:      Effort: Pulmonary effort is normal.   Musculoskeletal:        Feet:    Feet:      Right foot:      Toenail Condition: Right toenails are normal.   Skin:     General: Skin is warm and dry.      Capillary Refill: Capillary refill takes less than 2 seconds.   Neurological:      General: No focal deficit present.      Mental Status: She is alert and oriented to person, place, and time. Mental status is at baseline.   Psychiatric:         Mood and Affect: Mood normal.         Behavior: Behavior normal.         Thought Content: Thought content normal.         Judgment: Judgment normal.         ASSESSMENT/PLAN     1. Closed displaced fracture of proximal phalanx of lesser toe of right foot, initial encounter    Work excuse given to patient from today till 11/21/2022.  Advised patient to discuss with workplace regarding accommodations if possible.  Patient to continue with conservative treatment at home.  Patient aware that healing may take anywhere from 4-6 weeks or longer.  Patient to follow-up sooner as needed.  Will consider reimaging and/or referral to Ortho based on symptoms and x-ray findings.